# Patient Record
Sex: MALE | URBAN - METROPOLITAN AREA
[De-identification: names, ages, dates, MRNs, and addresses within clinical notes are randomized per-mention and may not be internally consistent; named-entity substitution may affect disease eponyms.]

---

## 2018-12-13 ENCOUNTER — AMBULATORY SURGICAL CENTER (OUTPATIENT)
Dept: URBAN - METROPOLITAN AREA SURGERY 20 | Facility: SURGERY | Age: 73
End: 2018-12-13

## 2018-12-13 DIAGNOSIS — K64.8 OTHER HEMORRHOIDS: ICD-10-CM

## 2018-12-13 DIAGNOSIS — K57.30 DIVERTICULOSIS OF LARGE INTESTINE WITHOUT PERFORATION OR ABS: ICD-10-CM

## 2018-12-13 DIAGNOSIS — Z85.038 PERSONAL HISTORY OF OTHER MALIGNANT NEOPLASM OF LARGE INTEST: ICD-10-CM

## 2018-12-13 DIAGNOSIS — Z12.11 ENCOUNTER FOR SCREENING FOR MALIGNANT NEOPLASM OF COLON: ICD-10-CM

## 2018-12-13 PROCEDURE — 45378 DIAGNOSTIC COLONOSCOPY: CPT | Mod: 33

## 2018-12-13 NOTE — SERVICEHPINOTES
NURA HILL  is a  73  male   who presents today for a  Colonoscopy   for   the indications listed below. The updated Patient Profile was reviewed prior to the procedure, in conjunction with the Physical Exam, including medical conditions, surgical procedures, medications, allergies, family history and social history. See Physical Exam time stamp below for date and time of HPI completion.Pre-operatively, I reviewed the indication(s) for the procedure, the risks of the procedure [including but not limited to: unexpected bleeding possibly requiring hospitalization and/or unplanned repeat procedures, perforation possibly requiring surgical treatment, missed lesions and complications of sedation/MAC (also explained by anesthesia staff)]. I have evaluated the patient for risks associated with the planned anesthesia and the procedure to be performed and find the patient an acceptable candidate for IV sedation.Multiple opportunities were provided for any questions or concerns, and all questions were answered satisfactorily before any anesthesia was administered. We will proceed with the planned procedure.BR

## 2019-09-11 ENCOUNTER — OFFICE VISIT (OUTPATIENT)
Dept: CARDIOLOGY | Facility: CLINIC | Age: 74
End: 2019-09-11

## 2019-09-11 VITALS
HEIGHT: 72 IN | BODY MASS INDEX: 28.04 KG/M2 | DIASTOLIC BLOOD PRESSURE: 60 MMHG | HEART RATE: 64 BPM | WEIGHT: 207 LBS | SYSTOLIC BLOOD PRESSURE: 122 MMHG

## 2019-09-11 DIAGNOSIS — I35.1 MODERATE AORTIC INSUFFICIENCY: ICD-10-CM

## 2019-09-11 DIAGNOSIS — Z95.1 HISTORY OF CORONARY ARTERY BYPASS SURGERY: ICD-10-CM

## 2019-09-11 DIAGNOSIS — I34.0 MITRAL VALVE INSUFFICIENCY, UNSPECIFIED ETIOLOGY: ICD-10-CM

## 2019-09-11 DIAGNOSIS — E78.5 HYPERLIPIDEMIA, UNSPECIFIED HYPERLIPIDEMIA TYPE: ICD-10-CM

## 2019-09-11 DIAGNOSIS — I25.10 CORONARY ARTERY DISEASE INVOLVING NATIVE CORONARY ARTERY OF NATIVE HEART WITHOUT ANGINA PECTORIS: Primary | ICD-10-CM

## 2019-09-11 DIAGNOSIS — I10 ESSENTIAL HYPERTENSION: ICD-10-CM

## 2019-09-11 PROCEDURE — 99214 OFFICE O/P EST MOD 30 MIN: CPT | Performed by: INTERNAL MEDICINE

## 2019-09-11 RX ORDER — LISINOPRIL 20 MG/1
20 TABLET ORAL DAILY
COMMUNITY
End: 2020-03-11

## 2019-09-11 RX ORDER — TERAZOSIN 2 MG/1
2 CAPSULE ORAL NIGHTLY
COMMUNITY
End: 2020-05-29

## 2019-09-11 RX ORDER — PRAVASTATIN SODIUM 10 MG
20 TABLET ORAL DAILY
COMMUNITY

## 2019-09-11 NOTE — PROGRESS NOTES
\[facility]\"" [facility]        Subjective:     Encounter Date:09/11/2019      Patient ID: Carlton Núñez is a 74 y.o. male.      HPI: I saw Carlton Núñez today for cardiovascular care.  He is a pleasant and active 74-year-old male.  He is free of angina.  He does report history of coronary artery disease status post coronary artery bypass surgery 12/1/1996 with a left COLLETTE to the LAD, and a right COLLETTE to the right coronary artery.  He denies any progressive dyspnea on exertion and is free of orthopnea or PND no lower extremity edema.  He is free of syncope near syncope or palpitation.  He does have a history of dyslipidemia and is on pravastatin 10 mg a day.  His lipids are monitored by Dr. Chan, his primary care physician.  He is recently been found to have type 2 diabetes and is maintained on diet and metformin at 1000 mg twice daily.  He has a history of hypertension which remains controlled with the addition of Terazosin 2 mg twice a day by Dr. Chan.  Patient did not tolerate clonidine.    His last echocardiogram from 5/18/2017 revealed left ventricular ejection fraction 55 to 60%.  He demonstrated moderate aortic insufficiency, mild to moderate mitral insufficiency and mild tricuspid insufficiency.      The following portions of the patient's history were reviewed and updated as appropriate: allergies, current medications, past family history, past medical history, past social history, past surgical history and problem list.    Problem List:  Patient Active Problem List   Diagnosis   • Hyperlipidemia   • LUCY (obstructive sleep apnea)   • Hypertension   • Myocardial infarction (CMS/HCC)   • Coronary artery disease   • Atrial fibrillation (CMS/HCC)   • Colon cancer (CMS/HCC)   • History of echocardiogram   • History of nuclear stress test   • History of cardiac cath   • History of coronary artery bypass surgery   • Moderate aortic insufficiency   • Mitral valve insufficiency       Past Medical  History:  Past Medical History:   Diagnosis Date   • Allergy    • Atrial fibrillation (CMS/HCC)    • Colon cancer (CMS/HCC)    • Coronary artery disease    • Heart attack (CMS/HCC)    • High cholesterol    • History of being hospitalized     Heart, colon cancer, hernia   • History of cardiac cath     11/28/2016 - Two vessel CAD with patent LIMA/KARIE graftsto LAD and RCA. Culprit vessel likely diagonal with 95% ostial lesion, too small for intervension, best treated medically. LCX 60% proximal but no flow limiting. EF 55-60%.   • History of echocardiogram     5/18/17 - Mild concentric LVH. EF 55-60%. Mildly dilated LA. Moderate AR. Mild to moderate MR. Mild TR ad PA   • History of nuclear stress test     11/10/16 - Mild inferior wall hypokinesis. Inferoapical wall hypokinesis. The overall EF is 56%. Study suggestive of prior inferior wall infarction with a mild to moderate area of ischemia remaining.   • Hyperlipidemia    • Hypertension    • Irregular heart beat    • Kidney stones    • Myocardial infarction (CMS/HCC)    • LUCY (obstructive sleep apnea)        Past Surgical History:  Past Surgical History:   Procedure Laterality Date   • CORONARY ARTERY BYPASS GRAFT  12/01/1996    LIMA to LAD and KARIE to RCA, performed by Dr Barney.       Social History:  Social History     Socioeconomic History   • Marital status:      Spouse name: Not on file   • Number of children: Not on file   • Years of education: Not on file   • Highest education level: Not on file   Tobacco Use   • Smoking status: Never Smoker   Substance and Sexual Activity   • Alcohol use: Yes       Allergies:  Allergies   Allergen Reactions   • Contrast Dye    • Phenergan [Promethazine Hcl] Unknown (See Comments)     .         ROS:  Review of Systems   Constitution: Negative for weakness and malaise/fatigue.   HENT: Negative for hearing loss and nosebleeds.    Eyes: Negative for double vision and visual disturbance.   Cardiovascular: Negative for  "chest pain, claudication, dyspnea on exertion, near-syncope, orthopnea, palpitations, paroxysmal nocturnal dyspnea and syncope.   Respiratory: Negative for cough, shortness of breath, sleep disturbances due to breathing, snoring, sputum production and wheezing.    Endocrine: Negative for cold intolerance, heat intolerance, polydipsia and polyuria.   Hematologic/Lymphatic: Negative for adenopathy and bleeding problem. Does not bruise/bleed easily.   Skin: Negative for flushing and itching.   Musculoskeletal: Negative for back pain, falls, joint pain, joint swelling, muscle weakness and neck pain.   Gastrointestinal: Negative for abdominal pain, dysphagia, heartburn, nausea and vomiting.   Genitourinary: Negative for dysuria and frequency.   Neurological: Negative for disturbances in coordination, dizziness, light-headedness, loss of balance and numbness.   Psychiatric/Behavioral: Negative for altered mental status and memory loss. The patient is not nervous/anxious.    Allergic/Immunologic: Negative for hives and persistent infections.          Objective:         /60   Pulse 64   Ht 182.9 cm (72\")   Wt 93.9 kg (207 lb)   BMI 28.07 kg/m²     Physical Exam   Constitutional: He is oriented to person, place, and time. He appears well-developed and well-nourished.   HENT:   Head: Normocephalic and atraumatic.   Eyes: Conjunctivae and EOM are normal. Pupils are equal, round, and reactive to light.   Neck: Neck supple. No thyromegaly present.   Cardiovascular: Normal rate, regular rhythm, S1 normal, S2 normal and intact distal pulses. PMI is not displaced. Exam reveals gallop and S4. Exam reveals no S3, no distant heart sounds, no friction rub, no midsystolic click and no opening snap.   No murmur heard.  Pulses:       Carotid pulses are 2+ on the right side, and 2+ on the left side.       Radial pulses are 2+ on the right side, and 2+ on the left side.        Femoral pulses are 2+ on the right side, and 2+ on the " left side.       Dorsalis pedis pulses are 2+ on the right side, and 2+ on the left side.        Posterior tibial pulses are 2+ on the right side, and 2+ on the left side.   1/6 systolic murmur at the left sternal border, 2/6 diastolic murmur at the right upper sternal border.   Pulmonary/Chest: Effort normal and breath sounds normal. No respiratory distress. He has no wheezes. He has no rales.   Abdominal: Soft. Bowel sounds are normal. He exhibits no distension and no mass. There is no tenderness.   Musculoskeletal: Normal range of motion. He exhibits no edema.   Neurological: He is alert and oriented to person, place, and time.   Skin: Skin is warm and dry. No erythema.   Psychiatric: He has a normal mood and affect.       In-Office Procedure(s):  Procedures    ASCVD RIsk Score::  The ASCVD Risk score (Coltorly AMARAL Jr., et al., 2013) failed to calculate for the following reasons:    Cannot find a previous HDL lab    Cannot find a previous total cholesterol lab        Assessment/Plan:         1. Coronary artery disease involving native coronary artery of native heart without angina pectoris  Stable stable    2. History of coronary artery bypass surgery  Stable    3. Moderate aortic insufficiency  Compensated    4. Mitral valve insufficiency, unspecified etiology  Compensated    5. Essential hypertension  Controlled    6. Hyperlipidemia, unspecified hyperlipidemia type  Continue diet and pravastatin 10 mg daily    7.  Type 2 diabetes   Diet and metformin/glimepiride    Mr. Núñez is stable.  He is active free of angina euvolemic.  He is tolerating his medications well no reported side effects.  No changes in his medications I will plan to see him in follow-up in 6 months sooner.       Alton Coronado MD  09/11/19  .

## 2020-03-11 ENCOUNTER — OFFICE VISIT (OUTPATIENT)
Dept: CARDIOLOGY | Facility: CLINIC | Age: 75
End: 2020-03-11

## 2020-03-11 VITALS
SYSTOLIC BLOOD PRESSURE: 134 MMHG | HEIGHT: 72 IN | DIASTOLIC BLOOD PRESSURE: 74 MMHG | BODY MASS INDEX: 27.22 KG/M2 | WEIGHT: 201 LBS | HEART RATE: 60 BPM

## 2020-03-11 DIAGNOSIS — I21.9 MYOCARDIAL INFARCTION, UNSPECIFIED MI TYPE, UNSPECIFIED ARTERY (HCC): ICD-10-CM

## 2020-03-11 DIAGNOSIS — Z95.1 HISTORY OF CORONARY ARTERY BYPASS SURGERY: ICD-10-CM

## 2020-03-11 DIAGNOSIS — R53.83 FATIGUE, UNSPECIFIED TYPE: ICD-10-CM

## 2020-03-11 DIAGNOSIS — R00.2 PALPITATIONS: ICD-10-CM

## 2020-03-11 DIAGNOSIS — E78.5 HYPERLIPIDEMIA, UNSPECIFIED HYPERLIPIDEMIA TYPE: ICD-10-CM

## 2020-03-11 DIAGNOSIS — I10 ESSENTIAL HYPERTENSION: ICD-10-CM

## 2020-03-11 DIAGNOSIS — R06.89 RESPIRATORY INSUFFICIENCY: ICD-10-CM

## 2020-03-11 DIAGNOSIS — I35.1 MODERATE AORTIC INSUFFICIENCY: ICD-10-CM

## 2020-03-11 DIAGNOSIS — I25.10 CORONARY ARTERY DISEASE INVOLVING NATIVE CORONARY ARTERY OF NATIVE HEART WITHOUT ANGINA PECTORIS: Primary | ICD-10-CM

## 2020-03-11 DIAGNOSIS — I34.0 MITRAL VALVE INSUFFICIENCY, UNSPECIFIED ETIOLOGY: ICD-10-CM

## 2020-03-11 DIAGNOSIS — G47.33 OSA (OBSTRUCTIVE SLEEP APNEA): ICD-10-CM

## 2020-03-11 PROCEDURE — 99214 OFFICE O/P EST MOD 30 MIN: CPT | Performed by: INTERNAL MEDICINE

## 2020-03-11 RX ORDER — ICOSAPENT ETHYL 1000 MG/1
2 CAPSULE ORAL 2 TIMES DAILY WITH MEALS
COMMUNITY

## 2020-03-11 NOTE — PROGRESS NOTES
[facility]        Subjective:     Encounter Date:03/11/2020      Patient ID: Carlton Núñez is a 74 y.o. male.      HPI: I saw the Carlton Núñez today for cardiovascular care.  He is a pleasant 74-year-old male with a primary complaint of generalized fatigue which has progressed as well as palpitations or irregular heartbeat.  He does not report chest pain pressure or tightness.  He does have a history of coronary heart disease status post coronary artery bypass surgery 1996 with a LIMA to the LAD and a KARIE to the right coronary artery.  His last noninvasive ischemic assessment was in 2016 with nuclear stress testing revealing a left ventricular ejection fraction 56% with mild to moderate inferior ischemia.  He underwent coronary angiography subsequently which revealed patent LIMA and KARIE grafts respectively to the LAD and right coronary artery echocardiogram obtained 5/18/2017 revealed preserved left ventricular function moderate aortic insufficiency and mild to moderate mitral insufficiency.  He does have dyspnea on exertion which he feels is progressive.  He sleeps with one pillow free of orthopnea or PND and no lower extremity edema.  He denies syncope or near syncope.  He does report increased palpitations or irregular heartbeat.  He has a history of hypertension which is controlled.  He has known dyslipidemia and is on pravastatin 10 mg daily.  His lipids are monitored by his primary care physician Dr. Dylan Chan.      The following portions of the patient's history were reviewed and updated as appropriate: allergies, current medications, past family history, past medical history, past social history, past surgical history and problem list.    Problem List:  Patient Active Problem List   Diagnosis   • Hyperlipidemia   • LUCY (obstructive sleep apnea)   • Hypertension   • Myocardial infarction (CMS/HCC)   • Coronary artery disease   • Colon cancer (CMS/[facility])   • History of  echocardiogram   • History of nuclear stress test   • History of cardiac cath   • History of coronary artery bypass surgery   • Moderate aortic insufficiency   • Mitral valve insufficiency   • Fatigue   • Respiratory insufficiency   • Palpitations       Past Medical History:  Past Medical History:   Diagnosis Date   • Allergy    • Atrial fibrillation (CMS/HCC)    • Colon cancer (CMS/HCC)    • Coronary artery disease    • Heart attack (CMS/HCC)    • High cholesterol    • History of being hospitalized     Heart, colon cancer, hernia   • History of cardiac cath     11/28/2016 - Two vessel CAD with patent LIMA/KARIE graftsto LAD and RCA. Culprit vessel likely diagonal with 95% ostial lesion, too small for intervension, best treated medically. LCX 60% proximal but no flow limiting. EF 55-60%.   • History of echocardiogram     5/18/17 - Mild concentric LVH. EF 55-60%. Mildly dilated LA. Moderate AR. Mild to moderate MR. Mild TR ad MI   • History of nuclear stress test     11/10/16 - Mild inferior wall hypokinesis. Inferoapical wall hypokinesis. The overall EF is 56%. Study suggestive of prior inferior wall infarction with a mild to moderate area of ischemia remaining.   • Hyperlipidemia    • Hypertension    • Irregular heart beat    • Kidney stones    • Myocardial infarction (CMS/HCC)    • LUCY (obstructive sleep apnea)        Past Surgical History:  Past Surgical History:   Procedure Laterality Date   • CORONARY ARTERY BYPASS GRAFT  12/01/1996    LIMA to LAD and KARIE to RCA, performed by Dr Barney.       Social History:  Social History     Socioeconomic History   • Marital status:      Spouse name: Not on file   • Number of children: Not on file   • Years of education: Not on file   • Highest education level: Not on file   Tobacco Use   • Smoking status: Never Smoker   • Smokeless tobacco: Never Used   Substance and Sexual Activity   • Alcohol use: Yes       Allergies:  Allergies   Allergen Reactions   • Contrast  "Dye Hives   • Phenergan [Promethazine Hcl] Nausea And Vomiting     .         ROS:  Review of Systems   Constitution: Positive for malaise/fatigue.   HENT: Negative for hearing loss and nosebleeds.    Eyes: Negative for double vision and visual disturbance.   Cardiovascular: Positive for dyspnea on exertion and palpitations. Negative for chest pain, claudication, near-syncope, orthopnea, paroxysmal nocturnal dyspnea and syncope.   Respiratory: Negative for cough, shortness of breath, sleep disturbances due to breathing, snoring, sputum production and wheezing.    Endocrine: Negative for cold intolerance, heat intolerance, polydipsia and polyuria.   Hematologic/Lymphatic: Negative for adenopathy and bleeding problem. Does not bruise/bleed easily.   Skin: Negative for flushing and itching.   Musculoskeletal: Negative for back pain, falls, joint pain, joint swelling, muscle weakness and neck pain.   Gastrointestinal: Negative for abdominal pain, dysphagia, heartburn, nausea and vomiting.   Genitourinary: Negative for dysuria and frequency.   Neurological: Negative for disturbances in coordination, dizziness, light-headedness, loss of balance, numbness and weakness.   Psychiatric/Behavioral: Negative for altered mental status and memory loss. The patient is not nervous/anxious.    Allergic/Immunologic: Negative for hives and persistent infections.          Objective:         /74   Pulse 60   Ht 182.9 cm (72\")   Wt 91.2 kg (201 lb)   BMI 27.26 kg/m²     Physical Exam   Constitutional: He is oriented to person, place, and time. He appears well-developed and well-nourished.   HENT:   Head: Normocephalic and atraumatic.   Eyes: Pupils are equal, round, and reactive to light. Conjunctivae and EOM are normal.   Neck: Neck supple. No thyromegaly present.   Cardiovascular: Normal rate, regular rhythm, S1 normal, S2 normal and intact distal pulses. PMI is not displaced. Exam reveals gallop and S4. Exam reveals no S3, no " distant heart sounds, no friction rub, no midsystolic click and no opening snap.   No murmur heard.  Pulses:       Carotid pulses are 2+ on the right side, and 2+ on the left side.       Radial pulses are 2+ on the right side, and 2+ on the left side.        Femoral pulses are 2+ on the right side, and 2+ on the left side.       Dorsalis pedis pulses are 2+ on the right side, and 2+ on the left side.        Posterior tibial pulses are 2+ on the right side, and 2+ on the left side.   1/6 systolic murmur left sternal border and 2/6 diastolic murmur at the right upper sternal border   Pulmonary/Chest: Effort normal and breath sounds normal. No respiratory distress. He has no wheezes. He has no rales.   Bibasilar crackles   Abdominal: Soft. Bowel sounds are normal. He exhibits no distension and no mass. There is no tenderness.   Musculoskeletal: Normal range of motion. He exhibits no edema.   Neurological: He is alert and oriented to person, place, and time.   Skin: Skin is warm and dry. No erythema.   Psychiatric: He has a normal mood and affect.       In-Office Procedure(s):  Procedures    ASCVD RIsk Score::  The ASCVD Risk score (Winton DC Jr., et al., 2013) failed to calculate for the following reasons:    Cannot find a previous HDL lab    Cannot find a previous total cholesterol lab        Assessment/Plan:         1. Coronary artery disease involving native coronary artery of native heart without angina pectoris  Fatigue respiratory insufficiency possible anginal equivalent    2. History of coronary artery bypass surgery      3. Myocardial infarction, unspecified MI type, unspecified artery (CMS/HCC)      4. Essential hypertension  Controlled    5. Hyperlipidemia, unspecified hyperlipidemia type  Continue pravastatin 10 mg daily    6. LUCY (obstructive sleep apnea)  Continue CPAP    7. Moderate aortic insufficiency  Presently compensated    8. Mitral valve insufficiency, unspecified etiology  Presently  compensated    9. Fatigue, unspecified type  New onset possible anginal equivalent    10. Respiratory insufficiency  Progressive possible anginal equivalent    11. Palpitations  Mildly symptomatic    12.  Type 2 diabetes    Mr. Núñez reports progressive dyspnea on exertion and distinct increase in his fatigue level.  Both may represent anginal equivalent.  I will obtain a stress Cardiolite noninvasive ischemic assessment and an echocardiogram.  We will consider ambulatory EKG monitor following the studies.  We discussed importance of continued risk modification by following his medical regimen, restricting salt in his diet and following a low-cholesterol low saturated fat diet.       Alton Coronado MD  03/11/20  .

## 2020-03-16 ENCOUNTER — TELEPHONE (OUTPATIENT)
Dept: CARDIOLOGY | Facility: CLINIC | Age: 75
End: 2020-03-16

## 2020-04-06 ENCOUNTER — APPOINTMENT (OUTPATIENT)
Dept: CARDIOLOGY | Facility: HOSPITAL | Age: 75
End: 2020-04-06

## 2020-04-06 ENCOUNTER — APPOINTMENT (OUTPATIENT)
Dept: NUCLEAR MEDICINE | Facility: HOSPITAL | Age: 75
End: 2020-04-06

## 2020-05-29 ENCOUNTER — HOSPITAL ENCOUNTER (OUTPATIENT)
Dept: NUCLEAR MEDICINE | Facility: HOSPITAL | Age: 75
Discharge: HOME | End: 2020-05-29

## 2020-05-29 ENCOUNTER — HOSPITAL ENCOUNTER (OUTPATIENT)
Dept: CARDIOLOGY | Facility: HOSPITAL | Age: 75
Discharge: HOME | End: 2020-05-29
Admitting: INTERNAL MEDICINE

## 2020-05-29 VITALS
WEIGHT: 201 LBS | HEART RATE: 60 BPM | SYSTOLIC BLOOD PRESSURE: 130 MMHG | HEIGHT: 72 IN | BODY MASS INDEX: 27.22 KG/M2 | DIASTOLIC BLOOD PRESSURE: 78 MMHG

## 2020-05-29 DIAGNOSIS — I35.1 MODERATE AORTIC INSUFFICIENCY: ICD-10-CM

## 2020-05-29 DIAGNOSIS — G47.33 OSA (OBSTRUCTIVE SLEEP APNEA): ICD-10-CM

## 2020-05-29 DIAGNOSIS — I21.9 MYOCARDIAL INFARCTION, UNSPECIFIED MI TYPE, UNSPECIFIED ARTERY (HCC): ICD-10-CM

## 2020-05-29 DIAGNOSIS — R06.89 RESPIRATORY INSUFFICIENCY: ICD-10-CM

## 2020-05-29 DIAGNOSIS — R53.83 FATIGUE, UNSPECIFIED TYPE: ICD-10-CM

## 2020-05-29 DIAGNOSIS — R00.2 PALPITATIONS: ICD-10-CM

## 2020-05-29 DIAGNOSIS — I34.0 MITRAL VALVE INSUFFICIENCY, UNSPECIFIED ETIOLOGY: ICD-10-CM

## 2020-05-29 DIAGNOSIS — E78.5 HYPERLIPIDEMIA, UNSPECIFIED HYPERLIPIDEMIA TYPE: ICD-10-CM

## 2020-05-29 DIAGNOSIS — I10 ESSENTIAL HYPERTENSION: ICD-10-CM

## 2020-05-29 DIAGNOSIS — I25.10 CORONARY ARTERY DISEASE INVOLVING NATIVE CORONARY ARTERY OF NATIVE HEART WITHOUT ANGINA PECTORIS: ICD-10-CM

## 2020-05-29 LAB
BH CV STRESS BP STAGE 1: NORMAL
BH CV STRESS DURATION MIN STAGE 1: 2
BH CV STRESS DURATION SEC STAGE 1: 59
BH CV STRESS GRADE STAGE 1: 10
BH CV STRESS HR STAGE 1: 148
BH CV STRESS METS STAGE 1: 5
BH CV STRESS PROTOCOL 1: NORMAL
BH CV STRESS RECOVERY BP: NORMAL MMHG
BH CV STRESS RECOVERY HR: 83 BPM
BH CV STRESS SPEED STAGE 1: 1.7
BH CV STRESS STAGE 1: 1
LV EF NUC BP: 67 %
MAXIMAL PREDICTED HEART RATE: 144 BPM
PERCENT MAX PREDICTED HR: 105.56 %
STRESS BASELINE BP: NORMAL MMHG
STRESS BASELINE HR: 67 BPM
STRESS PERCENT HR: 124 %
STRESS POST ESTIMATED WORKLOAD: 4.6 METS
STRESS POST EXERCISE DUR MIN: 2 MIN
STRESS POST EXERCISE DUR SEC: 59 SEC
STRESS POST PEAK BP: NORMAL MMHG
STRESS POST PEAK HR: 152 BPM
STRESS TARGET HR: 122 BPM

## 2020-05-29 PROCEDURE — 93017 CV STRESS TEST TRACING ONLY: CPT

## 2020-05-29 PROCEDURE — A9500 TC99M SESTAMIBI: HCPCS | Performed by: INTERNAL MEDICINE

## 2020-05-29 PROCEDURE — 93018 CV STRESS TEST I&R ONLY: CPT | Performed by: INTERNAL MEDICINE

## 2020-05-29 PROCEDURE — 93306 TTE W/DOPPLER COMPLETE: CPT

## 2020-05-29 PROCEDURE — 0 TECHNETIUM SESTAMIBI: Performed by: INTERNAL MEDICINE

## 2020-05-29 PROCEDURE — 78452 HT MUSCLE IMAGE SPECT MULT: CPT | Performed by: INTERNAL MEDICINE

## 2020-05-29 PROCEDURE — 93306 TTE W/DOPPLER COMPLETE: CPT | Performed by: INTERNAL MEDICINE

## 2020-05-29 PROCEDURE — 78452 HT MUSCLE IMAGE SPECT MULT: CPT

## 2020-05-29 RX ORDER — TERAZOSIN 2 MG/1
1 CAPSULE ORAL 2 TIMES DAILY
COMMUNITY

## 2020-05-29 RX ADMIN — TECHNETIUM TC 99M SESTAMIBI 1 DOSE: 1 INJECTION INTRAVENOUS at 08:55

## 2020-05-29 RX ADMIN — TECHNETIUM TC 99M SESTAMIBI 1 DOSE: 1 INJECTION INTRAVENOUS at 07:50

## 2020-06-01 DIAGNOSIS — Z01.818 PRE-OP TESTING: ICD-10-CM

## 2020-06-01 DIAGNOSIS — I20.8 ANGINAL EQUIVALENT (HCC): ICD-10-CM

## 2020-06-01 DIAGNOSIS — R53.83 FATIGUE, UNSPECIFIED TYPE: Primary | ICD-10-CM

## 2020-06-01 LAB
AORTIC DIMENSIONLESS INDEX: 0.7 (DI)
BH CV ECHO MEAS - ACS: 1.6 CM
BH CV ECHO MEAS - AI DEC SLOPE: 278 CM/SEC^2
BH CV ECHO MEAS - AI MAX PG: 89.1 MMHG
BH CV ECHO MEAS - AI MAX VEL: 472 CM/SEC
BH CV ECHO MEAS - AI P1/2T: 497.3 MSEC
BH CV ECHO MEAS - AO MAX PG (FULL): 5.3 MMHG
BH CV ECHO MEAS - AO MAX PG: 9.5 MMHG
BH CV ECHO MEAS - AO MEAN PG (FULL): 2 MMHG
BH CV ECHO MEAS - AO MEAN PG: 4 MMHG
BH CV ECHO MEAS - AO ROOT AREA (BSA CORRECTED): 1.7
BH CV ECHO MEAS - AO ROOT AREA: 11.3 CM^2
BH CV ECHO MEAS - AO ROOT DIAM: 3.8 CM
BH CV ECHO MEAS - AO V2 MAX: 154 CM/SEC
BH CV ECHO MEAS - AO V2 MEAN: 95 CM/SEC
BH CV ECHO MEAS - AO V2 VTI: 28.3 CM
BH CV ECHO MEAS - AVA(I,A): 3 CM^2
BH CV ECHO MEAS - AVA(I,D): 3 CM^2
BH CV ECHO MEAS - AVA(V,A): 2.8 CM^2
BH CV ECHO MEAS - AVA(V,D): 2.8 CM^2
BH CV ECHO MEAS - BSA(HAYCOCK): 2.3 M^2
BH CV ECHO MEAS - BSA: 2.2 M^2
BH CV ECHO MEAS - BZI_BMI: 30 KILOGRAMS/M^2
BH CV ECHO MEAS - BZI_METRIC_HEIGHT: 182.9 CM
BH CV ECHO MEAS - BZI_METRIC_WEIGHT: 100.2 KG
BH CV ECHO MEAS - EDV(CUBED): 166.4 ML
BH CV ECHO MEAS - EDV(MOD-SP2): 76 ML
BH CV ECHO MEAS - EDV(MOD-SP4): 83 ML
BH CV ECHO MEAS - EDV(TEICH): 147.4 ML
BH CV ECHO MEAS - EF(CUBED): 76.4 %
BH CV ECHO MEAS - EF(MOD-BP): 65 %
BH CV ECHO MEAS - EF(MOD-SP2): 65.8 %
BH CV ECHO MEAS - EF(MOD-SP4): 67.5 %
BH CV ECHO MEAS - EF(TEICH): 67.8 %
BH CV ECHO MEAS - ESV(CUBED): 39.3 ML
BH CV ECHO MEAS - ESV(MOD-SP2): 26 ML
BH CV ECHO MEAS - ESV(MOD-SP4): 27 ML
BH CV ECHO MEAS - ESV(TEICH): 47.4 ML
BH CV ECHO MEAS - FS: 38.2 %
BH CV ECHO MEAS - IVS/LVPW: 1
BH CV ECHO MEAS - IVSD: 1 CM
BH CV ECHO MEAS - LAT PEAK E' VEL: 12.8 CM/SEC
BH CV ECHO MEAS - LV DIASTOLIC VOL/BSA (35-75): 37.3 ML/M^2
BH CV ECHO MEAS - LV MASS(C)D: 213.2 GRAMS
BH CV ECHO MEAS - LV MASS(C)DI: 95.9 GRAMS/M^2
BH CV ECHO MEAS - LV MAX PG: 4.2 MMHG
BH CV ECHO MEAS - LV MEAN PG: 2 MMHG
BH CV ECHO MEAS - LV SYSTOLIC VOL/BSA (12-30): 12.1 ML/M^2
BH CV ECHO MEAS - LV V1 MAX: 102 CM/SEC
BH CV ECHO MEAS - LV V1 MEAN: 68.5 CM/SEC
BH CV ECHO MEAS - LV V1 VTI: 20.7 CM
BH CV ECHO MEAS - LVIDD: 5.5 CM
BH CV ECHO MEAS - LVIDS: 3.4 CM
BH CV ECHO MEAS - LVLD AP2: 7.8 CM
BH CV ECHO MEAS - LVLD AP4: 7.8 CM
BH CV ECHO MEAS - LVLS AP2: 6.3 CM
BH CV ECHO MEAS - LVLS AP4: 6.7 CM
BH CV ECHO MEAS - LVOT AREA (M): 4.2 CM^2
BH CV ECHO MEAS - LVOT AREA: 4.2 CM^2
BH CV ECHO MEAS - LVOT DIAM: 2.3 CM
BH CV ECHO MEAS - LVPWD: 1 CM
BH CV ECHO MEAS - MED PEAK E' VEL: 6.8 CM/SEC
BH CV ECHO MEAS - MV A DUR: 0.15 SEC
BH CV ECHO MEAS - MV A MAX VEL: 73.2 CM/SEC
BH CV ECHO MEAS - MV DEC SLOPE: 402 CM/SEC^2
BH CV ECHO MEAS - MV DEC TIME: 214 SEC
BH CV ECHO MEAS - MV E MAX VEL: 82.9 CM/SEC
BH CV ECHO MEAS - MV E/A: 1.1
BH CV ECHO MEAS - MV MAX PG: 2.7 MMHG
BH CV ECHO MEAS - MV MEAN PG: 1 MMHG
BH CV ECHO MEAS - MV P1/2T MAX VEL: 100 CM/SEC
BH CV ECHO MEAS - MV P1/2T: 72.9 MSEC
BH CV ECHO MEAS - MV V2 MAX: 81.8 CM/SEC
BH CV ECHO MEAS - MV V2 MEAN: 53.7 CM/SEC
BH CV ECHO MEAS - MV V2 VTI: 21.2 CM
BH CV ECHO MEAS - MVA P1/2T LCG: 2.2 CM^2
BH CV ECHO MEAS - MVA(P1/2T): 3 CM^2
BH CV ECHO MEAS - MVA(VTI): 4.1 CM^2
BH CV ECHO MEAS - PA ACC TIME: 0.05 SEC
BH CV ECHO MEAS - PA MAX PG (FULL): 5 MMHG
BH CV ECHO MEAS - PA MAX PG: 7.4 MMHG
BH CV ECHO MEAS - PA PR(ACCEL): 55.8 MMHG
BH CV ECHO MEAS - PA V2 MAX: 136 CM/SEC
BH CV ECHO MEAS - PULM A REVS DUR: 0.1 SEC
BH CV ECHO MEAS - PULM A REVS VEL: 44.5 CM/SEC
BH CV ECHO MEAS - PULM DIAS VEL: 70.2 CM/SEC
BH CV ECHO MEAS - PULM S/D: 0.97
BH CV ECHO MEAS - PULM SYS VEL: 67.9 CM/SEC
BH CV ECHO MEAS - RAP SYSTOLE: 3 MMHG
BH CV ECHO MEAS - RV MAX PG: 2.4 MMHG
BH CV ECHO MEAS - RV MEAN PG: 1 MMHG
BH CV ECHO MEAS - RV V1 MAX: 76.9 CM/SEC
BH CV ECHO MEAS - RV V1 MEAN: 51.9 CM/SEC
BH CV ECHO MEAS - RV V1 VTI: 16.3 CM
BH CV ECHO MEAS - RVSP: 27 MMHG
BH CV ECHO MEAS - SI(AO): 144.4 ML/M^2
BH CV ECHO MEAS - SI(CUBED): 57.2 ML/M^2
BH CV ECHO MEAS - SI(LVOT): 38.7 ML/M^2
BH CV ECHO MEAS - SI(MOD-SP2): 22.5 ML/M^2
BH CV ECHO MEAS - SI(MOD-SP4): 25.2 ML/M^2
BH CV ECHO MEAS - SI(TEICH): 45 ML/M^2
BH CV ECHO MEAS - SV(AO): 321 ML
BH CV ECHO MEAS - SV(CUBED): 127.1 ML
BH CV ECHO MEAS - SV(LVOT): 86 ML
BH CV ECHO MEAS - SV(MOD-SP2): 50 ML
BH CV ECHO MEAS - SV(MOD-SP4): 56 ML
BH CV ECHO MEAS - SV(TEICH): 100 ML
BH CV ECHO MEAS - TAPSE (>1.6): 1.9 CM2
BH CV ECHO MEAS - TR MAX PG: 24 MMHG
BH CV ECHO MEAS - TR MAX VEL: 247 CM/SEC
BH CV ECHO MEASUREMENTS AVERAGE E/E' RATIO: 8.46
BH CV XLRA - TDI S': 10.1 CM/SEC
LEFT ATRIUM VOLUME INDEX: 24.1 ML/M2
MAXIMAL PREDICTED HEART RATE: 144 BPM
STRESS TARGET HR: 122 BPM

## 2020-06-01 RX ORDER — PREDNISONE 50 MG/1
TABLET ORAL
Qty: 3 TABLET | Refills: 0 | Status: SHIPPED | OUTPATIENT
Start: 2020-06-01 | End: 2020-06-23 | Stop reason: HOSPADM

## 2020-06-01 RX ORDER — CIMETIDINE 400 MG/1
TABLET, FILM COATED ORAL
Qty: 1 TABLET | Refills: 0 | Status: SHIPPED | OUTPATIENT
Start: 2020-06-01 | End: 2020-06-23 | Stop reason: HOSPADM

## 2020-06-04 PROBLEM — I20.89 ANGINAL EQUIVALENT: Status: ACTIVE | Noted: 2020-06-04

## 2020-06-04 PROBLEM — I20.8 ANGINAL EQUIVALENT (HCC): Status: ACTIVE | Noted: 2020-06-04

## 2020-06-18 ENCOUNTER — TRANSCRIBE ORDERS (OUTPATIENT)
Dept: SLEEP MEDICINE | Facility: HOSPITAL | Age: 75
End: 2020-06-18

## 2020-06-18 ENCOUNTER — LAB (OUTPATIENT)
Dept: LAB | Facility: HOSPITAL | Age: 75
End: 2020-06-18

## 2020-06-18 DIAGNOSIS — I20.8 ANGINAL EQUIVALENT (HCC): ICD-10-CM

## 2020-06-18 DIAGNOSIS — R53.83 FATIGUE, UNSPECIFIED TYPE: ICD-10-CM

## 2020-06-18 DIAGNOSIS — Z01.818 PRE-OP TESTING: ICD-10-CM

## 2020-06-18 DIAGNOSIS — Z01.818 OTHER SPECIFIED PRE-OPERATIVE EXAMINATION: Primary | ICD-10-CM

## 2020-06-18 LAB
ALBUMIN SERPL-MCNC: 4.5 G/DL (ref 3.5–5.2)
ALBUMIN/GLOB SERPL: 1.8 G/DL
ALP SERPL-CCNC: 69 U/L (ref 39–117)
ALT SERPL W P-5'-P-CCNC: 18 U/L (ref 1–41)
ANION GAP SERPL CALCULATED.3IONS-SCNC: 15 MMOL/L (ref 5–15)
AST SERPL-CCNC: 16 U/L (ref 1–40)
BASOPHILS # BLD AUTO: 0.03 10*3/MM3 (ref 0–0.2)
BASOPHILS NFR BLD AUTO: 0.3 % (ref 0–1.5)
BILIRUB SERPL-MCNC: 0.6 MG/DL (ref 0.2–1.2)
BUN BLD-MCNC: 27 MG/DL (ref 8–23)
BUN/CREAT SERPL: 22.1 (ref 7–25)
CALCIUM SPEC-SCNC: 10.2 MG/DL (ref 8.6–10.5)
CHLORIDE SERPL-SCNC: 100 MMOL/L (ref 98–107)
CO2 SERPL-SCNC: 23 MMOL/L (ref 22–29)
CREAT BLD-MCNC: 1.22 MG/DL (ref 0.76–1.27)
DEPRECATED RDW RBC AUTO: 47.3 FL (ref 37–54)
EOSINOPHIL # BLD AUTO: 0.24 10*3/MM3 (ref 0–0.4)
EOSINOPHIL NFR BLD AUTO: 2.7 % (ref 0.3–6.2)
ERYTHROCYTE [DISTWIDTH] IN BLOOD BY AUTOMATED COUNT: 14.3 % (ref 12.3–15.4)
GFR SERPL CREATININE-BSD FRML MDRD: 58 ML/MIN/1.73
GLOBULIN UR ELPH-MCNC: 2.5 GM/DL
GLUCOSE BLD-MCNC: 103 MG/DL (ref 65–99)
HCT VFR BLD AUTO: 45.5 % (ref 37.5–51)
HGB BLD-MCNC: 15.4 G/DL (ref 13–17.7)
IMM GRANULOCYTES # BLD AUTO: 0.04 10*3/MM3 (ref 0–0.05)
IMM GRANULOCYTES NFR BLD AUTO: 0.5 % (ref 0–0.5)
INR PPP: 0.98 (ref 0.9–1.1)
LYMPHOCYTES # BLD AUTO: 2.17 10*3/MM3 (ref 0.7–3.1)
LYMPHOCYTES NFR BLD AUTO: 24.5 % (ref 19.6–45.3)
MCH RBC QN AUTO: 30.4 PG (ref 26.6–33)
MCHC RBC AUTO-ENTMCNC: 33.8 G/DL (ref 31.5–35.7)
MCV RBC AUTO: 89.9 FL (ref 79–97)
MONOCYTES # BLD AUTO: 0.83 10*3/MM3 (ref 0.1–0.9)
MONOCYTES NFR BLD AUTO: 9.4 % (ref 5–12)
NEUTROPHILS # BLD AUTO: 5.55 10*3/MM3 (ref 1.7–7)
NEUTROPHILS NFR BLD AUTO: 62.6 % (ref 42.7–76)
NRBC BLD AUTO-RTO: 0 /100 WBC (ref 0–0.2)
PLATELET # BLD AUTO: 174 10*3/MM3 (ref 140–450)
PMV BLD AUTO: 8.3 FL (ref 6–12)
POTASSIUM BLD-SCNC: 4.5 MMOL/L (ref 3.5–5.2)
PROT SERPL-MCNC: 7 G/DL (ref 6–8.5)
PROTHROMBIN TIME: 12.7 SECONDS (ref 11.7–14.2)
RBC # BLD AUTO: 5.06 10*6/MM3 (ref 4.14–5.8)
SODIUM BLD-SCNC: 138 MMOL/L (ref 136–145)
WBC NRBC COR # BLD: 8.86 10*3/MM3 (ref 3.4–10.8)

## 2020-06-18 PROCEDURE — 80053 COMPREHEN METABOLIC PANEL: CPT

## 2020-06-18 PROCEDURE — 85610 PROTHROMBIN TIME: CPT

## 2020-06-18 PROCEDURE — 85025 COMPLETE CBC W/AUTO DIFF WBC: CPT

## 2020-06-18 PROCEDURE — 36415 COLL VENOUS BLD VENIPUNCTURE: CPT

## 2020-06-20 ENCOUNTER — LAB (OUTPATIENT)
Dept: LAB | Facility: HOSPITAL | Age: 75
End: 2020-06-20

## 2020-06-20 DIAGNOSIS — Z01.818 OTHER SPECIFIED PRE-OPERATIVE EXAMINATION: ICD-10-CM

## 2020-06-20 PROCEDURE — U0004 COV-19 TEST NON-CDC HGH THRU: HCPCS

## 2020-06-22 LAB
REF LAB TEST METHOD: NORMAL
SARS-COV-2 RNA RESP QL NAA+PROBE: NOT DETECTED

## 2020-06-23 ENCOUNTER — HOSPITAL ENCOUNTER (OUTPATIENT)
Facility: HOSPITAL | Age: 75
Setting detail: HOSPITAL OUTPATIENT SURGERY
Discharge: HOME | End: 2020-06-23
Attending: INTERNAL MEDICINE | Admitting: INTERNAL MEDICINE

## 2020-06-23 VITALS
HEIGHT: 72 IN | HEART RATE: 60 BPM | DIASTOLIC BLOOD PRESSURE: 62 MMHG | BODY MASS INDEX: 27.09 KG/M2 | RESPIRATION RATE: 18 BRPM | WEIGHT: 200 LBS | OXYGEN SATURATION: 97 % | SYSTOLIC BLOOD PRESSURE: 132 MMHG | TEMPERATURE: 98.6 F

## 2020-06-23 DIAGNOSIS — R53.83 FATIGUE, UNSPECIFIED TYPE: ICD-10-CM

## 2020-06-23 DIAGNOSIS — I20.8 ANGINAL EQUIVALENT (HCC): ICD-10-CM

## 2020-06-23 PROBLEM — R94.39 ABNORMAL NUCLEAR STRESS TEST: Status: ACTIVE | Noted: 2020-06-23

## 2020-06-23 LAB — GLUCOSE BLDC GLUCOMTR-MCNC: 208 MG/DL (ref 70–130)

## 2020-06-23 PROCEDURE — C1894 INTRO/SHEATH, NON-LASER: HCPCS | Performed by: INTERNAL MEDICINE

## 2020-06-23 PROCEDURE — 25010000002 FENTANYL CITRATE (PF) 100 MCG/2ML SOLUTION: Performed by: INTERNAL MEDICINE

## 2020-06-23 PROCEDURE — 0 IOPAMIDOL PER 1 ML: Performed by: INTERNAL MEDICINE

## 2020-06-23 PROCEDURE — 93459 L HRT ART/GRFT ANGIO: CPT | Performed by: INTERNAL MEDICINE

## 2020-06-23 PROCEDURE — 99153 MOD SED SAME PHYS/QHP EA: CPT | Performed by: INTERNAL MEDICINE

## 2020-06-23 PROCEDURE — 82962 GLUCOSE BLOOD TEST: CPT

## 2020-06-23 PROCEDURE — 25010000002 MIDAZOLAM PER 1 MG: Performed by: INTERNAL MEDICINE

## 2020-06-23 PROCEDURE — C1769 GUIDE WIRE: HCPCS | Performed by: INTERNAL MEDICINE

## 2020-06-23 PROCEDURE — 99152 MOD SED SAME PHYS/QHP 5/>YRS: CPT | Performed by: INTERNAL MEDICINE

## 2020-06-23 RX ORDER — SODIUM CHLORIDE 0.9 % (FLUSH) 0.9 %
10 SYRINGE (ML) INJECTION AS NEEDED
Status: DISCONTINUED | OUTPATIENT
Start: 2020-06-23 | End: 2020-06-23 | Stop reason: HOSPADM

## 2020-06-23 RX ORDER — FENTANYL CITRATE 50 UG/ML
INJECTION, SOLUTION INTRAMUSCULAR; INTRAVENOUS AS NEEDED
Status: DISCONTINUED | OUTPATIENT
Start: 2020-06-23 | End: 2020-06-23 | Stop reason: HOSPADM

## 2020-06-23 RX ORDER — LIDOCAINE HYDROCHLORIDE 10 MG/ML
0.1 INJECTION, SOLUTION EPIDURAL; INFILTRATION; INTRACAUDAL; PERINEURAL ONCE AS NEEDED
Status: DISCONTINUED | OUTPATIENT
Start: 2020-06-23 | End: 2020-06-23 | Stop reason: HOSPADM

## 2020-06-23 RX ORDER — SODIUM CHLORIDE 9 MG/ML
250 INJECTION, SOLUTION INTRAVENOUS ONCE AS NEEDED
Status: DISCONTINUED | OUTPATIENT
Start: 2020-06-23 | End: 2020-06-23 | Stop reason: HOSPADM

## 2020-06-23 RX ORDER — SODIUM CHLORIDE 0.9 % (FLUSH) 0.9 %
3 SYRINGE (ML) INJECTION EVERY 12 HOURS SCHEDULED
Status: DISCONTINUED | OUTPATIENT
Start: 2020-06-23 | End: 2020-06-23 | Stop reason: HOSPADM

## 2020-06-23 RX ORDER — SODIUM CHLORIDE 9 MG/ML
75 INJECTION, SOLUTION INTRAVENOUS CONTINUOUS
Status: DISCONTINUED | OUTPATIENT
Start: 2020-06-23 | End: 2020-06-23 | Stop reason: HOSPADM

## 2020-06-23 RX ORDER — ACETAMINOPHEN 325 MG/1
650 TABLET ORAL EVERY 4 HOURS PRN
Status: DISCONTINUED | OUTPATIENT
Start: 2020-06-23 | End: 2020-06-23 | Stop reason: HOSPADM

## 2020-06-23 RX ORDER — LIDOCAINE HYDROCHLORIDE 20 MG/ML
INJECTION, SOLUTION INFILTRATION; PERINEURAL AS NEEDED
Status: DISCONTINUED | OUTPATIENT
Start: 2020-06-23 | End: 2020-06-23 | Stop reason: HOSPADM

## 2020-06-23 RX ORDER — MIDAZOLAM HYDROCHLORIDE 1 MG/ML
INJECTION INTRAMUSCULAR; INTRAVENOUS AS NEEDED
Status: DISCONTINUED | OUTPATIENT
Start: 2020-06-23 | End: 2020-06-23 | Stop reason: HOSPADM

## 2020-06-23 RX ADMIN — SODIUM CHLORIDE 75 ML/HR: 9 INJECTION, SOLUTION INTRAVENOUS at 10:24

## 2020-06-23 NOTE — DISCHARGE INSTRUCTIONS
PLEASE RESUME YOUR METFORMIN ON 06/25/2020      [facility]  [address] [place] [address]      Coronary Angiogram (Femoral Approach) After Care     Refer to this sheet in the next few weeks. These instructions provide you with information on caring for yourself after your procedure. Your health care provider may also give you more specific instructions. Your treatment has been planned according to current medical practices, but problems sometimes occur. Call your health care provider if you have any problems or questions after your procedure.      What to Expect After the Procedure:  After your procedure, it is typical to have the following sensations:  · Minor discomfort or tenderness and a small bump at the catheter insertion site. The bump should usually decrease in size and tenderness within 1 to 2 weeks.  · Any bruising will usually fade within 2 to 4 weeks.  Home Care Instructions:  · Do not apply powder or lotion to the site.  · Do not take baths, swim, or use a hot tub until your health care provider approves and the site is completely healed.  · Do not bend, squat, or lift anything over 20 lb (9 kg) or as directed by your health care provider. However, we recommend lifting nothing heavier than a gallon of milk.    · You may shower 24 hours after the procedure. Remove the bandage (dressing) and gently wash the site with plain soap and water. Gently pat the site dry. You may apply a band aid daily for 2 days if desired.    · Inspect the site at least twice daily.  · Increase your fluid intake for the next 2 days.    · Limit your activity for the first 48 hours. .    · Avoid strenuous activity for 1 week or as advised by your physician.    · Follow instructions about when you can drive or return to work as directed by your physician.    · Hold direct pressure over the site when you cough, sneeze, laugh or change positions.  Do this for the next 2 days.    · Do not operate machinery or  power tools for 24 hours.  · A responsible adult should be with you for the first 24 hours after you arrive home. Do not make any important legal decisions or sign legal papers for 24 hours.  Do not drink alcohol for 24 hours.  · Metformin or any medications containing Metformin should not be taken for 48 hours after your procedure.    Call Your Doctor If:  · You have drainage (other than a small amount of blood on the dressing).  · You have chills or a fever > 101.  · You have redness, warmth, swelling(larger than a walnut), or pain at the insertion site  · .You have heavy bleeding from the site. If this happens, hold pressure on the site and call 911.  · You develop chest pain or shortness of breath, feel faint, or pass out.  · You develop pain, discoloration, coldness, numbness, tingling, or severe bruising in the leg that held the catheter.  · You develop bleeding from any other place, such as the bowels.    Make Sure You:  · Understand these instructions.  · Will watch your condition.  · Will get help right away if you are not doing well or get worse.

## 2020-06-23 NOTE — H&P
[facility] H&P        Subjective:     Encounter Date:06/04/2020      Patient ID: Carlton Núñez is a 74 y.o. male.      Chief Complaint:    HPI:He is a pleasant 74-year-old male with a primary complaint of generalized fatigue which has progressed as well as palpitations or irregular heartbeat.  He does not report chest pain pressure or tightness.  He does have a history of coronary heart disease status post coronary artery bypass surgery 1996 with a LIMA to the LAD and a KARIE to the right coronary artery.  His last noninvasive ischemic assessment was in 2016 with nuclear stress testing revealing a left ventricular ejection fraction 56% with mild to moderate inferior ischemia.  He underwent coronary angiography subsequently which revealed patent LIMA and KARIE grafts respectively to the LAD and right coronary artery echocardiogram obtained 5/18/2017 revealed preserved left ventricular function moderate aortic insufficiency and mild to moderate mitral insufficiency.  He does have dyspnea on exertion which he feels is progressive.  He sleeps with one pillow free of orthopnea or PND and no lower extremity edema.  He denies syncope or near syncope.  He does report increased palpitations or irregular heartbeat.  He has a history of hypertension which is controlled.  He has known dyslipidemia and is on pravastatin 10 mg daily.  His lipids are monitored by his primary care physician Dr. Dylan Chan.    Nuclear stress test obtained 5/29/2020 revealed left ventricular ejection fraction 67% with ischemia in the inferior and inferior apical region.         The following portions of the patient's history were reviewed and updated as appropriate: allergies, current medications, past family history, past medical history, past social history, past surgical history and problem list.      Past Medical History:   Diagnosis Date   • Allergy    • Atrial fibrillation (CMS/HCC)    • Colon cancer (CMS/HCC)    •  Coronary artery disease    • Heart attack (CMS/HCC)    • High cholesterol    • History of being hospitalized     Heart, colon cancer, hernia   • History of cardiac cath     11/28/2016 - Two vessel CAD with patent LIMA/KARIE graftsto LAD and RCA. Culprit vessel likely diagonal with 95% ostial lesion, too small for intervension, best treated medically. LCX 60% proximal but no flow limiting. EF 55-60%.   • History of echocardiogram     5/18/17 - Mild concentric LVH. EF 55-60%. Mildly dilated LA. Moderate AR. Mild to moderate MR. Mild TR ad DC   • History of nuclear stress test     11/10/16 - Mild inferior wall hypokinesis. Inferoapical wall hypokinesis. The overall EF is 56%. Study suggestive of prior inferior wall infarction with a mild to moderate area of ischemia remaining.   • Hyperlipidemia    • Hypertension    • Irregular heart beat    • Kidney stones    • Myocardial infarction (CMS/HCC)    • LUCY (obstructive sleep apnea)          Past Surgical History:   Procedure Laterality Date   • CATARACT EXTRACTION WITH INTRAOCULAR LENS IMPLANT Bilateral    • COLON RESECTION     • CORONARY ARTERY BYPASS GRAFT  12/01/1996    LIMA to LAD and KARIE to RCA, performed by Dr Barney.   • INGUINAL HERNIA REPAIR Bilateral    • SINUS SURGERY     • TONSILLECTOMY           Social History     Socioeconomic History   • Marital status:      Spouse name: Not on file   • Number of children: Not on file   • Years of education: Not on file   • Highest education level: Not on file   Tobacco Use   • Smoking status: Never Smoker   • Smokeless tobacco: Never Used   Substance and Sexual Activity   • Alcohol use: Yes   • Drug use: Never   • Sexual activity: Defer         Allergies   Allergen Reactions   • Contrast Dye Hives   • Phenergan [Promethazine Hcl] Nausea And Vomiting     .   • Hydralazine Dizziness   • Iodinated Diagnostic Agents Hives   • Promethazine GI Intolerance   • Clonidine Rash       Current Medications:   Scheduled  "Meds:  sodium chloride 3 mL Intravenous Q12H     Continuous Infusions:  sodium chloride 75 mL/hr Last Rate: 75 mL/hr (06/23/20 1024)       Review of Systems   Constitution: Positive for malaise/fatigue.   HENT: Negative for hearing loss and nosebleeds.    Eyes: Negative for double vision and visual disturbance.   Cardiovascular: Positive for dyspnea on exertion and palpitations. Negative for chest pain, claudication, near-syncope, orthopnea, paroxysmal nocturnal dyspnea and syncope.   Respiratory: Negative for cough, shortness of breath, sleep disturbances due to breathing, snoring, sputum production and wheezing.    Endocrine: Negative for cold intolerance, heat intolerance, polydipsia and polyuria.   Hematologic/Lymphatic: Negative for adenopathy and bleeding problem. Does not bruise/bleed easily.   Skin: Negative for flushing and itching.   Musculoskeletal: Negative for back pain, falls, joint pain, joint swelling, muscle weakness and neck pain.   Gastrointestinal: Negative for abdominal pain, dysphagia, heartburn, nausea and vomiting.   Genitourinary: Negative for dysuria and frequency.   Neurological: Negative for disturbances in coordination, dizziness, light-headedness, loss of balance, numbness and weakness.   Psychiatric/Behavioral: Negative for altered mental status and memory loss. The patient is not nervous/anxious.    Allergic/Immunologic: Negative for hives and persistent infections.          Objective:         /71 (BP Location: Right arm, Patient Position: Lying)   Pulse 79   Temp 98.6 °F (37 °C) (Temporal)   Resp 20   Ht 182.9 cm (72\")   Wt 90.7 kg (200 lb)   SpO2 97%   BMI 27.12 kg/m²     Physical Exam   Constitutional: He is oriented to person, place, and time. He appears well-developed and well-nourished.   HENT:   Head: Normocephalic and atraumatic.   Eyes: Pupils are equal, round, and reactive to light. Conjunctivae and EOM are normal.   Neck: Neck supple. No thyromegaly present. "   Cardiovascular: Normal rate, regular rhythm, S1 normal, S2 normal and intact distal pulses. PMI is not displaced. Exam reveals gallop and S4. Exam reveals no S3, no distant heart sounds, no friction rub, no midsystolic click and no opening snap.   No murmur heard.  Pulses:       Carotid pulses are 2+ on the right side, and 2+ on the left side.       Radial pulses are 2+ on the right side, and 2+ on the left side.        Femoral pulses are 2+ on the right side, and 2+ on the left side.       Dorsalis pedis pulses are 2+ on the right side, and 2+ on the left side.        Posterior tibial pulses are 2+ on the right side, and 2+ on the left side.   1/6 systolic murmur left sternal border   Pulmonary/Chest: Effort normal and breath sounds normal. No respiratory distress. He has no wheezes. He has no rales.   Bibasilar crackles   Abdominal: Soft. Bowel sounds are normal. He exhibits no distension and no mass. There is no tenderness.   Musculoskeletal: Normal range of motion. He exhibits no edema.   Neurological: He is alert and oriented to person, place, and time.   Skin: Skin is warm and dry. No erythema.   Psychiatric: He has a normal mood and affect.             ASCVD RIsk Score::  The ASCVD Risk score ([place] Jr., et al., 2013) failed to calculate for the following reasons:    Cannot find a previous HDL lab    Cannot find a previous total cholesterol lab      Lab Review:   not applicable     Results from last 7 days   Lab Units 06/18/20  1507   SODIUM mmol/L 138   POTASSIUM mmol/L 4.5   CHLORIDE mmol/L 100   CO2 mmol/L 23.0   BUN mg/dL 27*   CREATININE mg/dL 1.22   GLUCOSE mg/dL 103*   CALCIUM mg/dL 10.2   AST (SGOT) U/L 16   ALT (SGPT) U/L 18         Results from last 7 days   Lab Units 06/18/20  1507   WBC 10*3/mm3 8.86   HEMOGLOBIN g/dL 15.4   HEMATOCRIT % 45.5   PLATELETS 10*3/mm3 174     Results from last 7 days   Lab Units 06/18/20  1507   INR  0.98               Invalid input(s): LDLCALC            Recent  Radiology:  Imaging Results (Most Recent)     None          EKG:        Assessment:         Active Hospital Problems    Diagnosis POA   • Anginal equivalent (CMS/HCC) [I20.8] Unknown     Added automatically from request for surgery [id number]     • Fatigue [R53.83] Unknown   1. Coronary artery disease involving native coronary artery of native heart without angina pectoris  Fatigue respiratory insufficiency possible anginal equivalent     2. History of coronary artery bypass surgery        3. Myocardial infarction, unspecified MI type, unspecified artery (CMS/HCC)        4. Essential hypertension  Controlled     5. Hyperlipidemia, unspecified hyperlipidemia type  Continue pravastatin 10 mg daily     6. LUCY (obstructive sleep apnea)  Continue CPAP     7. Moderate aortic insufficiency  Presently compensated     8. Mitral valve insufficiency, unspecified etiology  Presently compensated     9. Fatigue, unspecified type  New onset possible anginal equivalent     10. Respiratory insufficiency  Progressive possible anginal equivalent     11. Palpitations  Mildly symptomatic     12.  Type 2 diabetes       Plan:       Mr. Núñez is a history of coronary heart disease status post previous coronary artery bypass surgery 1996 with a LIMA to the LAD and KARIE to the right coronary artery.  He reports generalized fatigue increased dyspnea on exertion and has an abnormal nuclear for possible inferior ischemia.  He presents today to undergo coronary angiography.  Risk benefits been explained patient understands and is prepared to proceed.  He has a history of contrast allergy, he has been treated pretreated for his allergy.             Alton Coronado MD  06/23/20  10:37

## 2020-09-16 ENCOUNTER — OFFICE VISIT (OUTPATIENT)
Dept: CARDIOLOGY | Facility: CLINIC | Age: 75
End: 2020-09-16

## 2020-09-16 VITALS
SYSTOLIC BLOOD PRESSURE: 112 MMHG | DIASTOLIC BLOOD PRESSURE: 60 MMHG | BODY MASS INDEX: 27.25 KG/M2 | RESPIRATION RATE: 18 BRPM | HEART RATE: 64 BPM | WEIGHT: 201.2 LBS | OXYGEN SATURATION: 97 % | HEIGHT: 72 IN

## 2020-09-16 DIAGNOSIS — Z95.1 HISTORY OF CORONARY ARTERY BYPASS SURGERY: ICD-10-CM

## 2020-09-16 DIAGNOSIS — G47.33 OSA (OBSTRUCTIVE SLEEP APNEA): ICD-10-CM

## 2020-09-16 DIAGNOSIS — E78.5 HYPERLIPIDEMIA, UNSPECIFIED HYPERLIPIDEMIA TYPE: ICD-10-CM

## 2020-09-16 DIAGNOSIS — E11.9 TYPE 2 DIABETES MELLITUS WITHOUT COMPLICATION, WITHOUT LONG-TERM CURRENT USE OF INSULIN (HCC): ICD-10-CM

## 2020-09-16 DIAGNOSIS — I25.10 CORONARY ARTERY DISEASE INVOLVING NATIVE CORONARY ARTERY OF NATIVE HEART WITHOUT ANGINA PECTORIS: Primary | ICD-10-CM

## 2020-09-16 DIAGNOSIS — I35.1 MODERATE AORTIC INSUFFICIENCY: ICD-10-CM

## 2020-09-16 DIAGNOSIS — I10 ESSENTIAL HYPERTENSION: ICD-10-CM

## 2020-09-16 PROBLEM — I20.8 ANGINAL EQUIVALENT (HCC): Status: RESOLVED | Noted: 2020-06-04 | Resolved: 2020-09-16

## 2020-09-16 PROBLEM — I20.89 ANGINAL EQUIVALENT: Status: RESOLVED | Noted: 2020-06-04 | Resolved: 2020-09-16

## 2020-09-16 PROCEDURE — 99214 OFFICE O/P EST MOD 30 MIN: CPT | Performed by: INTERNAL MEDICINE

## 2020-09-16 NOTE — PROGRESS NOTES
[facility]        Subjective:     Encounter Date:09/16/2020      Patient ID: Carlton Núñez is a 75 y.o. male.      HPI: I saw the Carlton Núñez today for cardiovascular care.  He is a very pleasant 75-year-old male who observes the CDC guidelines for social distancing.  He is free of fever cough or increased shortness of breath.  He does not report any change in his sense of smell or taste.  Mr. Núñez has known coronary heart disease.  He underwent coronary artery bypass surgery 1996 with a LIMA to the LAD and a KARIE to the right coronary artery.  He recently underwent coronary angiography 6/23/2020.  This revealed widely patent LIMA to the LAD and KARIE to the right coronary no other significant obstructive disease.  Echocardiogram was obtained 5/29/2020 revealing left ventricular ejection fraction 65% with mild to moderate aortic insufficiency and mild tricuspid insufficiency.  Mr. Núñez is in good spirits and feels well.  He is free of angina and does not report any significant or progressive dyspnea on exertion.  He denies orthopnea or PND no lower extremity edema.  He is free of syncope or palpitations.  He does report very mild and infrequent orthostatic dizziness.  He has a history of hypertension which is well controlled.  He has known dyslipidemia and remains on pravastatin 10 mg daily.  He has a history of obstructive sleep apnea and uses CPAP.  He has type 2 diabetes controlled with diet and oral agents.      The following portions of the patient's history were reviewed and updated as appropriate: allergies, current medications, past family history, past medical history, past social history, past surgical history and problem list.    Problem List:  Patient Active Problem List   Diagnosis   • Hyperlipidemia   • LUCY (obstructive sleep apnea)   • Hypertension   • Myocardial infarction (CMS/[facility])   • Coronary artery disease   • Colon cancer (CMS/[facility])   • History of echocardiogram    • History of nuclear stress test   • History of cardiac cath   • History of coronary artery bypass surgery   • Moderate aortic insufficiency   • Mitral valve insufficiency   • Fatigue   • Respiratory insufficiency   • Palpitations   • Abnormal nuclear stress test       Past Medical History:  Past Medical History:   Diagnosis Date   • Allergy    • Atrial fibrillation (CMS/HCC)    • Colon cancer (CMS/HCC)    • Coronary artery disease    • Heart attack (CMS/HCC)    • High cholesterol    • History of being hospitalized     Heart, colon cancer, hernia   • History of cardiac cath     11/28/2016 - Two vessel CAD with patent LIMA/KARIE graftsto LAD and RCA. Culprit vessel likely diagonal with 95% ostial lesion, too small for intervension, best treated medically. LCX 60% proximal but no flow limiting. EF 55-60%.   • History of echocardiogram     5/18/17 - Mild concentric LVH. EF 55-60%. Mildly dilated LA. Moderate AR. Mild to moderate MR. Mild TR ad WI   • History of nuclear stress test     11/10/16 - Mild inferior wall hypokinesis. Inferoapical wall hypokinesis. The overall EF is 56%. Study suggestive of prior inferior wall infarction with a mild to moderate area of ischemia remaining.   • Hyperlipidemia    • Hypertension    • Irregular heart beat    • Kidney stones    • Myocardial infarction (CMS/HCC)    • LUCY (obstructive sleep apnea)        Past Surgical History:  Past Surgical History:   Procedure Laterality Date   • CARDIAC CATHETERIZATION N/A 6/23/2020    Procedure: Left Heart Cath;  Surgeon: Alton Coronado MD;  Location: [facility] INVASIVE LOCATION;  Service: Cardiovascular;  Laterality: N/A;   • CARDIAC CATHETERIZATION N/A 6/23/2020    Procedure: Coronary angiography;  Surgeon: Alton Coronado MD;  Location: [facility] INVASIVE LOCATION;  Service: Cardiovascular;  Laterality: N/A;   • CARDIAC CATHETERIZATION N/A 6/23/2020    Procedure: Left ventriculography;  Surgeon: Alton Coronado MD;  Location: [facility]  CATH INVASIVE LOCATION;  Service: Cardiovascular;  Laterality: N/A;   • CARDIAC CATHETERIZATION N/A 6/23/2020    Procedure: Native mammary injection;  Surgeon: Alton Coronado MD;  Location: [facility] CATH INVASIVE LOCATION;  Service: Cardiovascular;  Laterality: N/A;   • CATARACT EXTRACTION WITH INTRAOCULAR LENS IMPLANT Bilateral    • COLON RESECTION     • CORONARY ARTERY BYPASS GRAFT  12/01/1996    LIMA to LAD and KARIE to RCA, performed by Dr Barney.   • INGUINAL HERNIA REPAIR Bilateral    • SINUS SURGERY     • TONSILLECTOMY         Social History:  Social History     Socioeconomic History   • Marital status:      Spouse name: Not on file   • Number of children: Not on file   • Years of education: Not on file   • Highest education level: Not on file   Tobacco Use   • Smoking status: Never Smoker   • Smokeless tobacco: Never Used   Substance and Sexual Activity   • Alcohol use: Yes   • Drug use: Never   • Sexual activity: Defer       Allergies:  Allergies   Allergen Reactions   • Contrast Dye Hives   • Phenergan [Promethazine Hcl] Nausea And Vomiting     .   • Hydralazine Dizziness   • Iodinated Diagnostic Agents Hives   • Promethazine GI Intolerance   • Clonidine Rash         ROS:  Review of Systems   Constitution: Negative for malaise/fatigue.   HENT: Negative for hearing loss and nosebleeds.    Eyes: Negative for double vision and visual disturbance.   Cardiovascular: Negative for chest pain, claudication, dyspnea on exertion, near-syncope, orthopnea, palpitations, paroxysmal nocturnal dyspnea and syncope.        Rare orthostatic dizziness   Respiratory: Negative for cough, shortness of breath, sleep disturbances due to breathing, snoring, sputum production and wheezing.    Endocrine: Negative for cold intolerance, heat intolerance, polydipsia and polyuria.   Hematologic/Lymphatic: Negative for adenopathy and bleeding problem. Does not bruise/bleed easily.   Skin: Negative for flushing and itching.  "  Musculoskeletal: Negative for back pain, falls, joint pain, joint swelling, muscle weakness and neck pain.   Gastrointestinal: Negative for abdominal pain, dysphagia, heartburn, nausea and vomiting.   Genitourinary: Negative for dysuria and frequency.   Neurological: Negative for disturbances in coordination, dizziness, light-headedness, loss of balance, numbness and weakness.   Psychiatric/Behavioral: Negative for altered mental status and memory loss. The patient is not nervous/anxious.    Allergic/Immunologic: Negative for hives and persistent infections.          Objective:         /60 (BP Location: Left arm, Patient Position: Sitting, Cuff Size: Large Adult)   Pulse 64   Resp 18   Ht 182.9 cm (72\")   Wt 91.3 kg (201 lb 3.2 oz)   SpO2 97%   BMI 27.29 kg/m²     Constitutional:       Appearance: Well-developed.   Eyes:      Conjunctiva/sclera: Conjunctivae normal.      Pupils: Pupils are equal, round, and reactive to light.   HENT:      Head: Normocephalic and atraumatic.   Neck:      Musculoskeletal: Neck supple.      Thyroid: No thyromegaly.   Pulmonary:      Effort: Pulmonary effort is normal. No respiratory distress.      Breath sounds: Normal breath sounds. No wheezing. No rales.   Cardiovascular:      Normal rate. Regular rhythm.      Murmurs: There is a grade 1/6 crescendo-decrescendo midsystolic murmur at the URSB. There is a grade 1/4 high frequency blowing decrescendo, early diastolic murmur at the URSB, radiating to the apex.      S4 Gallop. No S3 gallop. Midsystolic click click.   Edema:     Peripheral edema absent.   Abdominal:      General: Bowel sounds are normal. There is no distension.      Palpations: Abdomen is soft. There is no abdominal mass.      Tenderness: There is no abdominal tenderness.   Musculoskeletal: Normal range of motion.   Skin:     General: Skin is warm and dry.      Findings: No erythema.   Neurological:      Mental Status: Alert and oriented to person, place, and " time.         In-Office Procedure(s):  Procedures    ASCVD RIsk Score::  The ASCVD Risk score ([place] CRISTIN Jr., et al., 2013) failed to calculate for the following reasons:    Cannot find a previous HDL lab    Cannot find a previous total cholesterol lab        Assessment/Plan:         1. Coronary artery disease involving native coronary artery of native heart without angina pectoris  Stable free of angina    2. History of coronary artery bypass surgery  Stable    3. LUCY (obstructive sleep apnea)  Continue CPAP    4. Essential hypertension  Controlled    5. Hyperlipidemia, unspecified hyperlipidemia type  Stable    6. Moderate aortic insufficiency  Compensated    7. Type 2 diabetes mellitus without complication, without long-term current use of insulin (CMS/[facility])  Stable    Mr. Núñez has maintained his activity level and is free of angina and remains euvolemic.  I reviewed with him his recent coronary angiogram and echocardiogram.  We discussed reducing his antihypertensive medication but he feels that his orthostatic dizziness is minimal at present.  Should his orthostatic symptoms progress we can reduce his amlodipine to 5 mg daily.  I have counseled him on continued risk modification I will see him in follow-up in 6 months or sooner if needed.           Alton Coronado MD  09/16/20  .

## 2021-03-16 PROBLEM — I35.1 MODERATE AORTIC INSUFFICIENCY: Chronic | Status: ACTIVE | Noted: 2019-09-11

## 2021-03-16 PROBLEM — I34.0 MITRAL VALVE INSUFFICIENCY: Chronic | Status: ACTIVE | Noted: 2019-09-11

## 2021-03-16 PROBLEM — R00.2 PALPITATIONS: Chronic | Status: ACTIVE | Noted: 2020-03-11

## 2021-03-16 PROBLEM — Z95.1 HISTORY OF CORONARY ARTERY BYPASS SURGERY: Chronic | Status: ACTIVE | Noted: 2019-09-11

## 2021-03-16 PROBLEM — R06.89 RESPIRATORY INSUFFICIENCY: Chronic | Status: ACTIVE | Noted: 2020-03-11

## 2021-03-17 ENCOUNTER — OFFICE VISIT (OUTPATIENT)
Dept: CARDIOLOGY | Facility: CLINIC | Age: 76
End: 2021-03-17

## 2021-03-17 VITALS
HEIGHT: 72 IN | OXYGEN SATURATION: 97 % | DIASTOLIC BLOOD PRESSURE: 64 MMHG | RESPIRATION RATE: 18 BRPM | WEIGHT: 205 LBS | BODY MASS INDEX: 27.77 KG/M2 | HEART RATE: 66 BPM | SYSTOLIC BLOOD PRESSURE: 120 MMHG

## 2021-03-17 DIAGNOSIS — I25.10 CORONARY ARTERY DISEASE INVOLVING NATIVE CORONARY ARTERY OF NATIVE HEART WITHOUT ANGINA PECTORIS: Primary | ICD-10-CM

## 2021-03-17 DIAGNOSIS — G47.33 OSA (OBSTRUCTIVE SLEEP APNEA): ICD-10-CM

## 2021-03-17 DIAGNOSIS — I35.1 MODERATE AORTIC INSUFFICIENCY: Chronic | ICD-10-CM

## 2021-03-17 DIAGNOSIS — I10 ESSENTIAL HYPERTENSION: ICD-10-CM

## 2021-03-17 DIAGNOSIS — E78.5 HYPERLIPIDEMIA, UNSPECIFIED HYPERLIPIDEMIA TYPE: ICD-10-CM

## 2021-03-17 DIAGNOSIS — Z95.1 HISTORY OF CORONARY ARTERY BYPASS SURGERY: Chronic | ICD-10-CM

## 2021-03-17 DIAGNOSIS — E11.9 TYPE 2 DIABETES MELLITUS WITHOUT COMPLICATION, WITHOUT LONG-TERM CURRENT USE OF INSULIN (HCC): ICD-10-CM

## 2021-03-17 PROBLEM — I34.0 MITRAL VALVE INSUFFICIENCY: Chronic | Status: RESOLVED | Noted: 2019-09-11 | Resolved: 2021-03-17

## 2021-03-17 PROCEDURE — 99214 OFFICE O/P EST MOD 30 MIN: CPT | Performed by: INTERNAL MEDICINE

## 2021-03-17 RX ORDER — UBIDECARENONE 100 MG
200 CAPSULE ORAL DAILY
COMMUNITY

## 2021-03-17 NOTE — PROGRESS NOTES
"Chief Complaint  Coronary Artery Disease, Hypertension, Hyperlipidemia, Cardiac Valve Problem (AR), and Sleep Apnea    Subjective    History of Present Illness      I saw Carlton Núñez today for continued cardiovascular care.  He is a very pleasant 75-year-old male who observes the CDC guidelines during the coronavirus pandemic.  He has received his COVID-19 vaccine.  Mr. Núñez is in good spirits and denies any chest pain pressure or tightness.  He is free of any progressive dyspnea on exertion and does not report orthopnea PND or lower extremity edema.  He denies syncope near syncope or palpitation.  I reviewed with him his echocardiogram from 5/29/2020.  This demonstrated preserved left ventricular function mild to moderate aortic insufficiency.  His nuclear stress test from that same date also demonstrates preserved left ventricular function but suggested mild inferior ischemia.  He subsequently underwent coronary angiography 6/25/2020.  This demonstrated both the LIMA to the LAD and a KARIE to the right coronary artery to be widely patent.  Otherwise there was nonobstructive disease.  He continues on his current medical regimen which he tolerates well.  He has a history of hyperlipidemia and remains on pravastatin 10 mg daily and Vascepa 2 g twice daily.  His lipids are monitored by his primary care physician Dr. Finn Chan.  He has type 2 diabetes managed by diet and Metformin.  His blood pressure is controlled at 120/64.  He continues to tolerate his medical regimen well no reported side effects.  He has obstructive sleep apnea and uses CPAP routinely.    Objective   Vital Signs:   /64 (BP Location: Left arm, Patient Position: Sitting, Cuff Size: Large Adult)   Pulse 66   Resp 18   Ht 182.9 cm (72\")   Wt 93 kg (205 lb)   SpO2 97%   BMI 27.80 kg/m²     Constitutional:       Appearance: Well-developed.   Eyes:      Conjunctiva/sclera: Conjunctivae normal.      Pupils: Pupils are equal, round, and " reactive to light.   HENT:      Head: Normocephalic and atraumatic.   Neck:      Thyroid: No thyromegaly.   Pulmonary:      Effort: Pulmonary effort is normal. No respiratory distress.      Breath sounds: Normal breath sounds. No wheezing. No rales.   Cardiovascular:      Normal rate. Regular rhythm.      Murmurs: There is a grade 1/4 high frequency blowing decrescendo, early diastolic murmur at the URSB, radiating to the apex.      S4 Gallop. No S3 gallop. Midsystolic click click.   Edema:     Peripheral edema absent.   Abdominal:      General: Bowel sounds are normal. There is no distension.      Palpations: Abdomen is soft. There is no abdominal mass.      Tenderness: There is no abdominal tenderness.   Musculoskeletal: Normal range of motion.      Cervical back: Neck supple. Skin:     General: Skin is warm and dry.      Findings: No erythema.   Neurological:      Mental Status: Alert and oriented to person, place, and time.         Result Review :     Common labs    Common Labsle 6/18/20 6/18/20    1507 1507   Glucose  103 (A)   BUN  27 (A)   Creatinine  1.22   eGFR Non African Am  58 (A)   Sodium  138   Potassium  4.5   Chloride  100   Calcium  10.2   Albumin  4.50   Total Bilirubin  0.6   Alkaline Phosphatase  69   AST (SGOT)  16   ALT (SGPT)  18   WBC 8.86    Hemoglobin 15.4    Hematocrit 45.5    Platelets 174    (A) Abnormal value            Data reviewed: Cardiology studies Echocardiogram 5/29/2020, nuclear stress test 5/29/2020, coronary angiography 6/23/2020          Assessment and Plan    1. Coronary artery disease involving native coronary artery of native heart without angina pectoris  Stable free of angina    2. History of coronary artery bypass surgery  Stable, LIMA to LAD and KARIE to right coronary artery patent, continue aspirin 81 mg daily    3. Essential hypertension  Controlled    4. Hyperlipidemia, unspecified hyperlipidemia type  Continue pravastatin and Vascepa    5. Type 2 diabetes mellitus  without complication, without long-term current use of insulin (CMS/[facility])  Diet and metformin control    6. Moderate aortic insufficiency  Compensated    7. LUCY (obstructive sleep apnea)  Continue CPAP    Mr. Núñez remains active but observes the CDC guidelines.  He is free of any chest pain pressure tightness or significant respiratory insufficiency.  He tolerates his medications well no reported side effects.  I will see him in follow-up in 6 months.  I encouraged him to observe a low-cholesterol low saturated fat diet.        Follow Up   No follow-ups on file.  Patient was given instructions and counseling regarding his condition or for health maintenance advice. Please see specific information pulled into the AVS if appropriate.

## 2021-10-22 NOTE — PROGRESS NOTES
"Chief Complaint  No chief complaint on file.    Subjective    History of Present Illness      I saw the [facility] today for cardiovascular care.  He is a pleasant 76-year-old male who denies chest pain pressure or tightness.  He has coronary heart disease and has undergone previous coronary artery bypass surgery.  Coronary angiography 6/25/2020 revealed a patent LIMA to the LAD and KARIE to the right coronary artery otherwise nonobstructive disease.  He does not report any progressive dyspnea on exertion.  He is free of orthopnea or PND no lower extremity edema.  He denies syncope or palpitation.  He does report orthostatic dizziness as blood pressure is noted to be 114/60 with a pulse of 60.  He has had a 5 pound weight reduction.  He has a history of hyperlipidemia and remains on Vascepa 2 g twice daily and pravastatin 20 mg daily    Objective   Vital Signs:   /60   Pulse 60   Ht 182.9 cm (72\")   Wt 90.7 kg (200 lb)   BMI 27.12 kg/m²     Constitutional:       Appearance: Well-developed.   Eyes:      Conjunctiva/sclera: Conjunctivae normal.      Pupils: Pupils are equal, round, and reactive to light.   HENT:      Head: Normocephalic and atraumatic.   Neck:      Thyroid: No thyromegaly.   Pulmonary:      Effort: Pulmonary effort is normal. No respiratory distress.      Breath sounds: Normal breath sounds. No wheezing. No rales.   Cardiovascular:      Normal rate. Regular rhythm.      Murmurs: There is a grade 1/4 high frequency blowing decrescendo, early diastolic murmur at the URSB, radiating to the apex.      No gallop. No S3 and S4 gallop. No rub.   Edema:     Peripheral edema absent.   Abdominal:      General: Bowel sounds are normal. There is no distension.      Palpations: Abdomen is soft. There is no abdominal mass.      Tenderness: There is no abdominal tenderness.   Musculoskeletal: Normal range of motion.      Cervical back: Neck supple. Skin:     General: Skin is warm and dry.      " Findings: No erythema.   Neurological:      Mental Status: Alert and oriented to person, place, and time.         Result Review :                   Assessment and Plan    1. Coronary artery disease involving native coronary artery of native heart without angina pectoris  Stable    2. History of coronary artery bypass surgery  Stable    3. Essential hypertension  Controlled    4. Hyperlipidemia, unspecified hyperlipidemia type  Stable    5. Type 2 diabetes mellitus without complication, without long-term current use of insulin (HCC)  Stable    6. Moderate aortic insufficiency  Compensated    7. LUCY (obstructive sleep apnea)  Continue CPAP    8. Orthostatic dizziness  Moderately symptomatic    Carlton is free of angina and appears euvolemic on physical examination.  He does report symptoms consistent with orthostatic dizziness that are recurrent.  I will reduce his metoprolol succinate from 50 to 25 mg daily.  He will report back if his orthostatic symptoms persist.  I will otherwise see him in follow-up in 6 months.  I counseled him to continue a low-cholesterol low saturated fat diet and continue his current medical regimen otherwise.        Follow Up   No follow-ups on file.  Patient was given instructions and counseling regarding his condition or for health maintenance advice. Please see specific information pulled into the AVS if appropriate.

## 2021-10-25 ENCOUNTER — OFFICE VISIT (OUTPATIENT)
Dept: CARDIOLOGY | Facility: CLINIC | Age: 76
End: 2021-10-25

## 2021-10-25 VITALS
WEIGHT: 200 LBS | SYSTOLIC BLOOD PRESSURE: 114 MMHG | BODY MASS INDEX: 27.09 KG/M2 | DIASTOLIC BLOOD PRESSURE: 60 MMHG | HEART RATE: 60 BPM | HEIGHT: 72 IN

## 2021-10-25 DIAGNOSIS — I35.1 MODERATE AORTIC INSUFFICIENCY: ICD-10-CM

## 2021-10-25 DIAGNOSIS — I25.10 CORONARY ARTERY DISEASE INVOLVING NATIVE CORONARY ARTERY OF NATIVE HEART WITHOUT ANGINA PECTORIS: Primary | ICD-10-CM

## 2021-10-25 DIAGNOSIS — E78.5 HYPERLIPIDEMIA, UNSPECIFIED HYPERLIPIDEMIA TYPE: ICD-10-CM

## 2021-10-25 DIAGNOSIS — Z95.1 HISTORY OF CORONARY ARTERY BYPASS SURGERY: ICD-10-CM

## 2021-10-25 DIAGNOSIS — R42 ORTHOSTATIC DIZZINESS: ICD-10-CM

## 2021-10-25 DIAGNOSIS — I10 ESSENTIAL HYPERTENSION: ICD-10-CM

## 2021-10-25 DIAGNOSIS — E11.9 TYPE 2 DIABETES MELLITUS WITHOUT COMPLICATION, WITHOUT LONG-TERM CURRENT USE OF INSULIN (HCC): ICD-10-CM

## 2021-10-25 DIAGNOSIS — G47.33 OSA (OBSTRUCTIVE SLEEP APNEA): ICD-10-CM

## 2021-10-25 PROCEDURE — 99214 OFFICE O/P EST MOD 30 MIN: CPT | Performed by: INTERNAL MEDICINE

## 2021-11-30 ENCOUNTER — LAB REQUISITION (OUTPATIENT)
Dept: LAB | Facility: HOSPITAL | Age: 76
End: 2021-11-30

## 2021-11-30 DIAGNOSIS — Z00.00 ENCOUNTER FOR GENERAL ADULT MEDICAL EXAMINATION WITHOUT ABNORMAL FINDINGS: ICD-10-CM

## 2021-11-30 LAB — SARS-COV-2 ORF1AB RESP QL NAA+PROBE: NOT DETECTED

## 2021-11-30 PROCEDURE — U0004 COV-19 TEST NON-CDC HGH THRU: HCPCS | Performed by: INTERNAL MEDICINE

## 2021-12-02 ENCOUNTER — AMBULATORY SURGICAL CENTER (OUTPATIENT)
Dept: URBAN - METROPOLITAN AREA SURGERY 20 | Facility: SURGERY | Age: 76
End: 2021-12-02

## 2021-12-02 DIAGNOSIS — K63.89 OTHER SPECIFIED DISEASES OF INTESTINE: ICD-10-CM

## 2021-12-02 DIAGNOSIS — Z98.0 INTESTINAL BYPASS AND ANASTOMOSIS STATUS: ICD-10-CM

## 2021-12-02 DIAGNOSIS — Z85.038 PERSONAL HISTORY OF OTHER MALIGNANT NEOPLASM OF LARGE INTEST: ICD-10-CM

## 2021-12-02 PROCEDURE — 45378 DIAGNOSTIC COLONOSCOPY: CPT | Mod: 33 | Performed by: INTERNAL MEDICINE

## 2022-05-24 NOTE — PROGRESS NOTES
"Chief Complaint  Coronary Artery Disease, Cardiac Valve Problem (AI), Hypertension, and Hyperlipidemia    Subjective    History of Present Illness      I saw the [facility] today for cardiovascular care.  He is a pleasant 76-year-old male who has known coronary heart disease status post previous coronary artery bypass surgery.  Coronary angiography 6/25/2020 revealed patent LIMA to the LAD and KARIE to the right coronary artery.  There was otherwise nonobstructive coronary disease.  He remains free of angina.  He does not report any significant dyspnea on exertion.  He is free of orthopnea or PND no lower extremity edema.  He denies syncope and reports rare palpitations.  He does report symptoms of dizziness most consistent with vertigo in the recent past.  He states that this seems to have resolved.Echocardiogram from 5/29/2020 reveals left ventricular ejection fraction 65%, mild to moderate aortic regurgitation, right ventricular systolic pressure less than 35 mmHg.    Blood pressure lying 150/74 heart rate 68, sitting 130/64 heart rate 78, standing 134/70 heart rate 88 and no dizziness    Objective   Vital Signs:   Resp 18   Ht 182.9 cm (72\")   Wt 87.5 kg (193 lb)   SpO2 98%   BMI 26.18 kg/m²     Constitutional:       Appearance: Well-developed.   Eyes:      Conjunctiva/sclera: Conjunctivae normal.      Pupils: Pupils are equal, round, and reactive to light.   HENT:      Head: Normocephalic and atraumatic.   Neck:      Thyroid: No thyromegaly.   Pulmonary:      Effort: Pulmonary effort is normal. No respiratory distress.      Breath sounds: Normal breath sounds. No wheezing. No rales.   Cardiovascular:      Normal rate. Regular rhythm.      Murmurs: There is a grade 1/4 high frequency blowing decrescendo, early diastolic murmur at the URSB, radiating to the apex.      No gallop. No S3 and S4 gallop. No rub.   Edema:     Peripheral edema absent.   Abdominal:      General: Bowel sounds are normal. There is " no distension.      Palpations: Abdomen is soft. There is no abdominal mass.      Tenderness: There is no abdominal tenderness.   Musculoskeletal: Normal range of motion.      Cervical back: Neck supple. Skin:     General: Skin is warm and dry.      Findings: No erythema.   Neurological:      Mental Status: Alert and oriented to person, place, and time.         Result Review :              ECG 12 Lead    Date/Time: 5/25/2022 4:32 PM  Performed by: Alton Coronado MD  Authorized by: Alton Coronado MD   Comparison: compared with previous ECG from 1/15/2020  Comparison to previous ECG: PVC is new  Rhythm: sinus rhythm  Ectopy: atrial premature contractions and infrequent PVCs  BPM: 76  Conduction: left anterior fascicular block  Other findings: non-specific ST-T wave changes              Assessment and Plan    1. Coronary artery disease involving native coronary artery of native heart without angina pectoris  Stable free of angina    2. History of coronary artery bypass surgery  LIMA to LAD and KARIE to right coronary    3. Moderate aortic insufficiency  Compensated    4. Primary hypertension  Stable    5. Hyperlipidemia, unspecified hyperlipidemia type  Stable    6. LUCY (obstructive sleep apnea)  CPAP    7. Palpitations  Stable    Carlton is free of angina and euvolemic on examination.  His blood pressure is controlled and he tolerates his medications well.  I suspect his dizziness was vertigo.  I made no changes in his medications at the present time.  Have encouraged him to observe a low-cholesterol restricted salt diet.  I will see him in follow-up in 6 months.  If he has further dizziness he will contact us.        Follow Up   No follow-ups on file.  Patient was given instructions and counseling regarding his condition or for health maintenance advice. Please see specific information pulled into the AVS if appropriate.

## 2022-05-25 ENCOUNTER — OFFICE VISIT (OUTPATIENT)
Dept: CARDIOLOGY | Facility: CLINIC | Age: 77
End: 2022-05-25

## 2022-05-25 VITALS — WEIGHT: 193 LBS | OXYGEN SATURATION: 98 % | HEIGHT: 72 IN | RESPIRATION RATE: 18 BRPM | BODY MASS INDEX: 26.14 KG/M2

## 2022-05-25 DIAGNOSIS — G47.33 OSA (OBSTRUCTIVE SLEEP APNEA): ICD-10-CM

## 2022-05-25 DIAGNOSIS — I25.10 CORONARY ARTERY DISEASE INVOLVING NATIVE CORONARY ARTERY OF NATIVE HEART WITHOUT ANGINA PECTORIS: Primary | ICD-10-CM

## 2022-05-25 DIAGNOSIS — I10 PRIMARY HYPERTENSION: ICD-10-CM

## 2022-05-25 DIAGNOSIS — I35.1 MODERATE AORTIC INSUFFICIENCY: ICD-10-CM

## 2022-05-25 DIAGNOSIS — R00.2 PALPITATIONS: ICD-10-CM

## 2022-05-25 DIAGNOSIS — E78.5 HYPERLIPIDEMIA, UNSPECIFIED HYPERLIPIDEMIA TYPE: ICD-10-CM

## 2022-05-25 DIAGNOSIS — Z95.1 HISTORY OF CORONARY ARTERY BYPASS SURGERY: ICD-10-CM

## 2022-05-25 PROCEDURE — 93000 ELECTROCARDIOGRAM COMPLETE: CPT | Performed by: INTERNAL MEDICINE

## 2022-05-25 PROCEDURE — 99214 OFFICE O/P EST MOD 30 MIN: CPT | Performed by: INTERNAL MEDICINE

## 2022-10-21 ENCOUNTER — OFFICE VISIT (OUTPATIENT)
Dept: CARDIOLOGY | Facility: CLINIC | Age: 77
End: 2022-10-21

## 2022-10-21 VITALS
SYSTOLIC BLOOD PRESSURE: 172 MMHG | DIASTOLIC BLOOD PRESSURE: 74 MMHG | WEIGHT: 194.8 LBS | BODY MASS INDEX: 26.38 KG/M2 | HEART RATE: 63 BPM | HEIGHT: 72 IN

## 2022-10-21 DIAGNOSIS — Z95.1 HISTORY OF CORONARY ARTERY BYPASS SURGERY: Chronic | ICD-10-CM

## 2022-10-21 DIAGNOSIS — I25.10 CORONARY ARTERY DISEASE INVOLVING NATIVE CORONARY ARTERY OF NATIVE HEART WITHOUT ANGINA PECTORIS: Primary | Chronic | ICD-10-CM

## 2022-10-21 PROCEDURE — 93000 ELECTROCARDIOGRAM COMPLETE: CPT | Performed by: INTERNAL MEDICINE

## 2022-10-21 PROCEDURE — 99204 OFFICE O/P NEW MOD 45 MIN: CPT | Performed by: INTERNAL MEDICINE

## 2022-10-21 RX ORDER — PRAVASTATIN SODIUM 20 MG
20 TABLET ORAL DAILY
COMMUNITY

## 2022-10-21 NOTE — PROGRESS NOTES
Date of Office Visit: 10/21/22  Encounter Provider: Frank Jim MD  Place of Service: [facility]  Patient Name: Carlton Núñez  :1945  [id number]    Chief Complaint   Patient presents with   • Coronary Artery Disease   :     HPI: Carlton Núñez is a 77 y.o. male is coming as a new patient to see me today he previously did see Alton Jackson he had a two-vessel bypass in the  with Robert ireland.  He had a cath in  both mammary arteries were used and both were patent circumflex was really free of disease in the past he has had normal LV function.  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  He does not have any angina or shortness of breath no PND orthopnea edema syncope or palpitations he does not have diabetes does not smoke he does have hypertension he thinks his pressure is usually better than what we are getting here today no other complaints    Past Medical History:   Diagnosis Date   • Allergy    • Atrial fibrillation (HCC)    • Colon cancer (HCC)    • Coronary artery disease    • Heart attack (HCC)    • High cholesterol    • History of being hospitalized     Heart, colon cancer, hernia   • History of cardiac cath     2016 - Two vessel CAD with patent LIMA/KARIE graftsto LAD and RCA. Culprit vessel likely diagonal with 95% ostial lesion, too small for intervension, best treated medically. LCX 60% proximal but no flow limiting. EF 55-60%.   • History of echocardiogram     17 - Mild concentric LVH. EF 55-60%. Mildly dilated LA. Moderate AR. Mild to moderate MR. Mild TR ad KS   • History of nuclear stress test     11/10/16 - Mild inferior wall hypokinesis. Inferoapical wall hypokinesis. The overall EF is 56%. Study suggestive of prior inferior wall infarction with a mild to moderate area of ischemia remaining.   • Hyperlipidemia    • Hypertension    • Irregular heart beat    • Kidney stones    • Myocardial  infarction (HCC)    • LUCY (obstructive sleep apnea)        Past Surgical History:   Procedure Laterality Date   • CARDIAC CATHETERIZATION N/A 6/23/2020    Procedure: Left Heart Cath;  Surgeon: Alton Coronado MD;  Location:  [facility];  Service: Cardiovascular;  Laterality: N/A;   • CARDIAC CATHETERIZATION N/A 6/23/2020    Procedure: Coronary angiography;  Surgeon: Alton Coronado MD;  Location:  [facility];  Service: Cardiovascular;  Laterality: N/A;   • CARDIAC CATHETERIZATION N/A 6/23/2020    Procedure: Left ventriculography;  Surgeon: Alton Coronado MD;  Location:  [facility];  Service: Cardiovascular;  Laterality: N/A;   • CARDIAC CATHETERIZATION N/A 6/23/2020    Procedure: Native mammary injection;  Surgeon: Alton Coronado MD;  [facility];  Service: Cardiovascular;  Laterality: N/A;   • CATARACT EXTRACTION WITH INTRAOCULAR LENS IMPLANT Bilateral    • COLON RESECTION     • CORONARY ARTERY BYPASS GRAFT  12/01/1996    LIMA to LAD and KARIE to RCA, performed by Dr Barney.   • INGUINAL HERNIA REPAIR Bilateral    • SINUS SURGERY     • TONSILLECTOMY         Social History     Socioeconomic History   • Marital status:    Tobacco Use   • Smoking status: Never   • Smokeless tobacco: Never   Substance and Sexual Activity   • Alcohol use: Yes   • Drug use: Never   • Sexual activity: Defer       Family History   Problem Relation Age of Onset   • Cancer Other    • Heart attack Other    • Hypertension Other        Review of Systems   Constitutional: Negative for decreased appetite, fever, malaise/fatigue and weight loss.   HENT: Negative for nosebleeds.    Eyes: Negative for double vision.   Cardiovascular: Negative for chest pain, claudication, cyanosis, dyspnea on exertion, irregular heartbeat, leg swelling, near-syncope, orthopnea, palpitations, paroxysmal nocturnal dyspnea and syncope.   Respiratory: Negative for cough,  hemoptysis and shortness of breath.    Hematologic/Lymphatic: Negative for bleeding problem.   Skin: Negative for rash.   Musculoskeletal: Negative for falls and myalgias.   Gastrointestinal: Negative for hematochezia, jaundice, melena, nausea and vomiting.   Genitourinary: Negative for hematuria.   Neurological: Negative for dizziness and seizures.   Psychiatric/Behavioral: Negative for altered mental status and memory loss.       Allergies   Allergen Reactions   • Contrast Dye Hives   • Phenergan [Promethazine Hcl] Nausea And Vomiting     .   • Hydralazine Dizziness   • Iodinated Diagnostic Agents Hives   • Promethazine GI Intolerance   • Clonidine Rash         Current Outpatient Medications:   •  amLODIPine (NORVASC) 5 MG tablet, Take 5 mg by mouth Daily., Disp: , Rfl:   •  aspirin 81 MG tablet, Take 81 mg by mouth Daily., Disp: , Rfl:   •  coenzyme Q10 100 MG capsule, Take 200 mg by mouth Daily., Disp: , Rfl:   •  empagliflozin (JARDIANCE) 10 MG tablet tablet, Take  by mouth. Take one tablet daily, Disp: , Rfl:   •  hydrochlorothiazide (HYDRODIURIL) 25 MG tablet, Take 25 mg by mouth 2 (Two) Times a Day., Disp: , Rfl:   •  icosapent ethyl (VASCEPA) 1 g capsule capsule, Take 2 g by mouth 2 (Two) Times a Day With Meals., Disp: , Rfl:   •  lisinopril (PRINIVIL,ZESTRIL) 40 MG tablet, Take 40 mg by mouth Daily. Take one tablet in the a.m., Disp: , Rfl:   •  metFORMIN (GLUCOPHAGE) 1000 MG tablet, Take 1,000 mg by mouth 2 (Two) Times a Day With Meals., Disp: , Rfl:   •  metoprolol succinate XL (TOPROL-XL) 25 MG 24 hr tablet, Take 25 mg by mouth Daily., Disp: , Rfl:   •  pravastatin (PRAVACHOL) 20 MG tablet, Take 1 tablet by mouth Daily., Disp: , Rfl:   •  terazosin (HYTRIN) 2 MG capsule, Take 1 capsule by mouth 2 (Two) Times a Day., Disp: , Rfl:   •  pravastatin (PRAVACHOL) 10 MG tablet, Take 20 mg by mouth Daily., Disp: , Rfl:       Objective:     Vitals:    10/21/22 1026   BP: 172/74   Pulse: 63   Weight: 88.4 kg (194  "lb 12.8 oz)   Height: 182.9 cm (72\")     Body mass index is 26.42 kg/m².    Constitutional:       Appearance: Well-developed.   Eyes:      General: No scleral icterus.  HENT:      Head: Normocephalic.   Neck:      Thyroid: No thyromegaly.      Vascular: No JVD.      Lymphadenopathy: No cervical adenopathy.   Pulmonary:      Effort: Pulmonary effort is normal.      Breath sounds: Normal breath sounds. No wheezing. No rales.   Cardiovascular:      Normal rate. Regular rhythm.      No gallop.   Edema:     Peripheral edema absent.   Abdominal:      Palpations: Abdomen is soft.      Tenderness: There is no abdominal tenderness.   Musculoskeletal: Normal range of motion. Skin:     General: Skin is warm and dry.      Findings: No rash.   Neurological:      Mental Status: Alert and oriented to person, place, and time.           ECG 12 Lead    Date/Time: 10/21/2022 11:23 AM  Performed by: Frank Jim MD  Authorized by: Frank Jim MD   Comparison: compared with previous ECG   Similar to previous ECG  Rhythm: sinus rhythm  Conduction: non-specific intraventricular conduction delay  QRS axis: left    Clinical impression: abnormal EKG             Assessment:       Diagnosis Plan   1. Coronary artery disease involving native coronary artery of native heart without angina pectoris        2. History of coronary artery bypass surgery               Plan:       I think he is doing well and will continue to do well risk modification is critical he has an apical LAD lesion not amenable to PCI so treatment of his diabetes is critical he is 77 he is on a moderate intensity statin that is probably okay as long as his lipids are all right his primary care physician is following that I am not can to make any changes I think he is doing quite well and I think that the future for him looks great from a cardiac standpoint I will have him come back and see Eliana in a year and then see me in 2    No follow-ups on file.     As always, " it has been a pleasure to participate in your patient's care.      Sincerely,       Frank Jim MD

## 2023-10-24 ENCOUNTER — OFFICE VISIT (OUTPATIENT)
Dept: CARDIOLOGY | Facility: CLINIC | Age: 78
End: 2023-10-24
Payer: COMMERCIAL

## 2023-10-24 VITALS
WEIGHT: 191 LBS | HEIGHT: 72 IN | OXYGEN SATURATION: 98 % | DIASTOLIC BLOOD PRESSURE: 70 MMHG | BODY MASS INDEX: 25.87 KG/M2 | SYSTOLIC BLOOD PRESSURE: 142 MMHG | HEART RATE: 78 BPM

## 2023-10-24 DIAGNOSIS — G47.33 OSA (OBSTRUCTIVE SLEEP APNEA): Chronic | ICD-10-CM

## 2023-10-24 DIAGNOSIS — E78.2 MIXED HYPERLIPIDEMIA: Chronic | ICD-10-CM

## 2023-10-24 DIAGNOSIS — I10 PRIMARY HYPERTENSION: Chronic | ICD-10-CM

## 2023-10-24 DIAGNOSIS — Z95.1 HISTORY OF CORONARY ARTERY BYPASS SURGERY: Chronic | ICD-10-CM

## 2023-10-24 DIAGNOSIS — I35.1 MODERATE AORTIC INSUFFICIENCY: Primary | Chronic | ICD-10-CM

## 2023-10-24 PROCEDURE — 99214 OFFICE O/P EST MOD 30 MIN: CPT | Performed by: NURSE PRACTITIONER

## 2023-10-24 PROCEDURE — 93000 ELECTROCARDIOGRAM COMPLETE: CPT | Performed by: NURSE PRACTITIONER

## 2023-10-24 RX ORDER — LISINOPRIL 20 MG/1
20 TABLET ORAL 2 TIMES DAILY
Qty: 180 TABLET | Refills: 3 | Status: SHIPPED | OUTPATIENT
Start: 2023-10-24 | End: 2023-10-24 | Stop reason: SDUPTHER

## 2023-10-24 RX ORDER — LISINOPRIL 20 MG/1
20 TABLET ORAL 2 TIMES DAILY
Qty: 180 TABLET | Refills: 3 | Status: SHIPPED | OUTPATIENT
Start: 2023-10-24

## 2023-10-24 NOTE — PROGRESS NOTES
Date of Office Visit: 10/24/2023  Encounter Provider: BEVERLY Hays  Place of Service: [facility] CARDIOLOGY  Patient Name: Carlton Núñez  :1945    Chief Complaint   Patient presents with    Follow-up    Coronary Artery Disease   :     HPI: Carlton Núñez is a 78 y.o. male who is a patient of Dr. Jim and is new to me today.  Previously was seen by another cardiologist that is no longer at [facility].  He had a two-vessel bypass at [facility] by Dr. Robert Coe in the past.  This was done in the .  He had a cardiac cath in  both KARIE and LIMA were used and both were patent.  Circumflex was free of disease normal EF.  He did have some moderate aortic insufficiency.  He is a retired  works on a farm.  He was last in the office a year ago and not having any angina or shortness of breath.  He has hypertension and dyslipidemia.    Overall he is doing well.  Denies any chest pain, pressure or tightness.  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  He tolerates activities well.  He has no symptoms of claudication.  He does have some PVCs on his EKG today but he is not having any palpitations.  He does not check his blood pressure at home.  Last cholesterol showed a total cholesterol of 156, HDL 44, triglyceride of 186 and LDL of 84.  He is on pravastatin.  Previous testing and notes have been reviewed by me.   Past Medical History:   Diagnosis Date    Allergy     Atrial fibrillation     Colon cancer     Coronary artery disease     Heart attack     High cholesterol     History of being hospitalized     Heart, colon cancer, hernia    History of cardiac cath     2016 - Two vessel CAD with patent LIMA/KARIE graftsto LAD and RCA. Culprit vessel likely diagonal with 95% ostial lesion, too small for intervension, best treated medically. LCX 60% proximal but no flow limiting. EF 55-60%.    History of echocardiogram      5/18/17 - Mild concentric LVH. EF 55-60%. Mildly dilated LA. Moderate AR. Mild to moderate MR. Mild TR ad MA    History of nuclear stress test     11/10/16 - Mild inferior wall hypokinesis. Inferoapical wall hypokinesis. The overall EF is 56%. Study suggestive of prior inferior wall infarction with a mild to moderate area of ischemia remaining.    Hyperlipidemia     Hypertension     Irregular heart beat     Kidney stones     Myocardial infarction     LUCY (obstructive sleep apnea)        Past Surgical History:   Procedure Laterality Date    CARDIAC CATHETERIZATION N/A 6/23/2020    Procedure: Left Heart Cath;  Surgeon: Alton Coronado MD;  [facility];  Service: Cardiovascular;  Laterality: N/A;    CARDIAC CATHETERIZATION N/A 6/23/2020    Procedure: Coronary angiography;  Surgeon: Alton Coronado MD;  Location:  [facility] CATH INVASIVE LOCATION;  Service: Cardiovascular;  Laterality: N/A;    CARDIAC CATHETERIZATION N/A 6/23/2020    Procedure: Left ventriculography;  Surgeon: Alton Coronado MD;  Location:  [facility] CATH INVASIVE LOCATION;  Service: Cardiovascular;  Laterality: N/A;    CARDIAC CATHETERIZATION N/A 6/23/2020    Procedure: Native mammary injection;  Surgeon: Alton Coronado MD;  Location:  [facility] CATH INVASIVE LOCATION;  Service: Cardiovascular;  Laterality: N/A;    CATARACT EXTRACTION WITH INTRAOCULAR LENS IMPLANT Bilateral     COLON RESECTION      CORONARY ARTERY BYPASS GRAFT  12/01/1996    LIMA to LAD and KARIE to RCA, performed by Dr Barney.    INGUINAL HERNIA REPAIR Bilateral     SINUS SURGERY      TONSILLECTOMY         Social History     Socioeconomic History    Marital status:    Tobacco Use    Smoking status: Never    Smokeless tobacco: Never   Substance and Sexual Activity    Alcohol use: Yes    Drug use: Never    Sexual activity: Defer       Family History   Problem Relation Age of Onset    Cancer Other     Heart attack Other     Hypertension Other        Review of  Systems   Constitutional: Negative for diaphoresis and malaise/fatigue.   Cardiovascular:  Negative for chest pain, claudication, dyspnea on exertion, irregular heartbeat, leg swelling, near-syncope, orthopnea, palpitations, paroxysmal nocturnal dyspnea and syncope.   Respiratory:  Negative for cough, shortness of breath and sleep disturbances due to breathing.    Musculoskeletal:  Negative for falls.   Neurological:  Negative for dizziness and weakness.   Psychiatric/Behavioral:  Negative for altered mental status and substance abuse.        Allergies   Allergen Reactions    Contrast Dye (Echo Or Unknown Ct/Mr) Hives    Phenergan [Promethazine Hcl] Nausea And Vomiting     .    Hydralazine Dizziness    Iodinated Contrast Media Hives    Promethazine GI Intolerance    Clonidine Rash         Current Outpatient Medications:     amLODIPine (NORVASC) 5 MG tablet, Take 1 tablet by mouth Daily., Disp: , Rfl:     aspirin 81 MG tablet, Take 1 tablet by mouth Daily., Disp: , Rfl:     coenzyme Q10 100 MG capsule, Take 2 capsules by mouth Daily., Disp: , Rfl:     empagliflozin (JARDIANCE) 10 MG tablet tablet, Take  by mouth. Take one tablet daily, Disp: , Rfl:     hydrochlorothiazide (HYDRODIURIL) 25 MG tablet, Take 1 tablet by mouth 2 (Two) Times a Day., Disp: , Rfl:     icosapent ethyl (VASCEPA) 1 g capsule capsule, Take 2 g by mouth 2 (Two) Times a Day With Meals., Disp: , Rfl:     metFORMIN (GLUCOPHAGE) 1000 MG tablet, Take 1 tablet by mouth 2 (Two) Times a Day With Meals., Disp: , Rfl:     metoprolol succinate XL (TOPROL-XL) 25 MG 24 hr tablet, Take 1 tablet by mouth Daily., Disp: , Rfl:     pravastatin (PRAVACHOL) 20 MG tablet, Take 1 tablet by mouth Daily., Disp: , Rfl:     terazosin (HYTRIN) 2 MG capsule, Take 1 capsule by mouth 2 (Two) Times a Day., Disp: , Rfl:     lisinopril (PRINIVIL,ZESTRIL) 20 MG tablet, Take 1 tablet by mouth 2 (Two) Times a Day., Disp: 180 tablet, Rfl: 3      Objective:     Vitals:    10/24/23  "1031   BP: 142/70   Pulse: 78   SpO2: 98%   Weight: 86.6 kg (191 lb)   Height: 182.9 cm (72\")     Body mass index is 25.9 kg/m².    PHYSICAL EXAM:    Constitutional:       General: Not in acute distress.     Appearance: Normal appearance. Well-developed.   Eyes:      Pupils: Pupils are equal, round, and reactive to light.   HENT:      Head: Normocephalic.   Neck:      Vascular: No carotid bruit or JVD.   Pulmonary:      Effort: Pulmonary effort is normal. No tachypnea.      Breath sounds: Normal breath sounds. No wheezing. No rales.   Cardiovascular:      Normal rate. Regular rhythm.      No gallop.    Pulses:     Intact distal pulses.   Edema:     Peripheral edema absent.   Abdominal:      General: Bowel sounds are normal.      Palpations: Abdomen is soft.      Tenderness: There is no abdominal tenderness.   Musculoskeletal: Normal range of motion.      Cervical back: Normal range of motion and neck supple. No edema. Skin:     General: Skin is warm and dry.   Neurological:      Mental Status: Alert and oriented to person, place, and time.           ECG 12 Lead    Date/Time: 10/24/2023 11:02 AM  Performed by: Shyanne Morgan APRN    Authorized by: Shyanne Morgan APRN  Comparison: compared with previous ECG from 10/21/2022  Similar to previous ECG  Rhythm: sinus rhythm  Rate: normal  Conduction: non-specific intraventricular conduction delay  QRS axis: normal  Other findings: poor R wave progression    Clinical impression: abnormal EKG            Assessment:       Diagnosis Plan   1. Moderate aortic insufficiency     Stable on last echo, asymptomatic   2. Primary hypertension     I want him to split his lisinopril in half and take 20 mg twice a day.  Asked him to check his blood pressure regularly at home.  He has an appointment at the end of the year with his primary care who can further manage his blood pressure.   3. History of coronary artery bypass surgery     No angina symptoms on aspirin, statin and " beta-blocker   4. Mixed hyperlipidemia     LDL mildly elevated at 84.  Discussed with parent him goal is less than 70   5. LUCY (obstructive sleep apnea)     Compliant with CPAP     No orders of the defined types were placed in this encounter.         Plan:   Follow-up in 1 years time         Your medication list            Accurate as of October 24, 2023 11:02 AM. If you have any questions, ask your nurse or doctor.                CHANGE how you take these medications        Instructions Last Dose Given Next Dose Due   lisinopril 20 MG tablet  Commonly known as: IVY CORREIASTRIL  What changed:   medication strength  how much to take  when to take this  additional instructions  Changed by: BEVERLY Hays      Take 1 tablet by mouth 2 (Two) Times a Day.              CONTINUE taking these medications        Instructions Last Dose Given Next Dose Due   amLODIPine 5 MG tablet  Commonly known as: NORVASC      Take 1 tablet by mouth Daily.       aspirin 81 MG tablet      Take 1 tablet by mouth Daily.       coenzyme Q10 100 MG capsule      Take 2 capsules by mouth Daily.       empagliflozin 10 MG tablet tablet  Commonly known as: JARDIANCE      Take  by mouth. Take one tablet daily       hydroCHLOROthiazide 25 MG tablet  Commonly known as: HYDRODIURIL      Take 1 tablet by mouth 2 (Two) Times a Day.       icosapent ethyl 1 g capsule capsule  Commonly known as: VASCEPA      Take 2 g by mouth 2 (Two) Times a Day With Meals.       metFORMIN 1000 MG tablet  Commonly known as: GLUCOPHAGE      Take 1 tablet by mouth 2 (Two) Times a Day With Meals.       metoprolol succinate XL 25 MG 24 hr tablet  Commonly known as: TOPROL-XL      Take 1 tablet by mouth Daily.       pravastatin 20 MG tablet  Commonly known as: PRAVACHOL      Take 1 tablet by mouth Daily.       terazosin 2 MG capsule  Commonly known as: HYTRIN      Take 1 capsule by mouth 2 (Two) Times a Day.                 Where to Get Your Medications        These  medications were sent to [facility] #[id number] - [place], [place] - [address] - [phone]  - [phone]  [facility] [address]      Phone: [phone]   lisinopril 20 MG tablet           As always, it has been a pleasure to participate in your patient's care.      Sincerely,     Shyanne PAUL

## 2024-05-09 ENCOUNTER — CLINICAL SUPPORT (OUTPATIENT)
Dept: GENETICS | Facility: HOSPITAL | Age: 79
End: 2024-05-09
Payer: COMMERCIAL

## 2024-05-09 DIAGNOSIS — C18.9 MALIGNANT NEOPLASM OF COLON, UNSPECIFIED PART OF COLON: ICD-10-CM

## 2024-05-09 DIAGNOSIS — Z80.0 FAMILY HISTORY OF PANCREATIC CANCER: ICD-10-CM

## 2024-05-09 DIAGNOSIS — Z84.89 FAMILY HISTORY OF GENETIC DISORDER: ICD-10-CM

## 2024-05-09 DIAGNOSIS — Z13.79 GENETIC TESTING: Primary | ICD-10-CM

## 2024-05-09 DIAGNOSIS — Z80.3 FAMILY HISTORY OF BREAST CANCER: ICD-10-CM

## 2024-05-09 PROCEDURE — 96040: CPT | Performed by: GENETIC COUNSELOR, MS

## 2024-05-22 ENCOUNTER — DOCUMENTATION (OUTPATIENT)
Dept: GENETICS | Facility: HOSPITAL | Age: 79
End: 2024-05-22
Payer: COMMERCIAL

## 2024-05-22 ENCOUNTER — TELEPHONE (OUTPATIENT)
Dept: GENETICS | Facility: HOSPITAL | Age: 79
End: 2024-05-22
Payer: COMMERCIAL

## 2024-05-22 NOTE — TELEPHONE ENCOUNTER
Attempted to contact Mr. Núñez regarding genetic test results. He did not answer. Left  asking for him to return my call.

## 2024-05-22 NOTE — PROGRESS NOTES
Carlton Núñez is an 78-year-old male who was referred for genetic counseling due to a family history of cancer and the identification of a PALB2 mutation in his daughter. Mr. Núñez was diagnosed with colon cancer in 2012. He had a colon resection and chemotherapy. His most recent colonoscopy was in 2022, and he has these done every 3 years. Mr. Núñez was interested in discussing his risk for a PABL2 mutation and opted to pursue single site testing for the mutation identified in his daughter. 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 Mr. Núñez was interested in discussing the information related to this finding. These results were discussed with Mr. Núñez by telephone on 5/22/24.      PERTINENT FAMILY HISTORY: (See attached pedigree)   Daughter:  PALB2+ ([id number])  Mother:  Pancreatic cancer     Breast cancer    Records regarding Mr. Núñez's daughter's genetic test results were available for review and a copy was provided to the testing lab. Mr. Núñez's daughter had testing via the [employer] panel.     GENETIC COUNSELING (40 minutes): We reviewed the family history information in detail. Cases of cancer follow three general patterns: sporadic, familial, and hereditary. While most cancer is sporadic, some cases appear to occur in family clusters. These cases are said to be familial and account for 10-20% of breast cancer cases. Familial cases may be due to a combination of shared genes and environmental factors among family members. In even fewer families, the cancer is said to be inherited, and the genes responsible for the cancer are known.      Family histories typical of hereditary cancer syndromes usually include multiple first- and second-degree relatives diagnosed with cancer types that define a syndrome. These cases tend to be diagnosed at younger-than-expected ages and can be bilateral or multifocal. The cancer in these families follows an  autosomal dominant inheritance pattern, which indicates the likely presence of a mutation in a cancer susceptibility gene. Children and siblings of an individual believed to carry this mutation have a 50% chance of inheriting that mutation, thereby inheriting the increased risk to develop cancer. These mutations can be passed down from the maternal or the paternal lineage.     Given the family history of a PALB2 mutation, we discussed this in more detail. Mutations in PALB2 have been found to increase the risk of female breast cancer resulting in a lifetime risk of approximately 41-60%, a 3-5% ovarian cancer risk, and a 2-5% risk for pancreatic cancer (NCCN 3.2024). There is also a slight risk for male breast cancer, 0.9% risk by age 70.    We discussed the availability of multigene panels that allow for testing of a number of cancer susceptibility genes simultaneously. The additional genes included have varying degrees of risk associated, and some have also been associated with increased risks for other cancers.     GENETIC TESTING:  The risks, benefits and limitations of genetic testing and implications for clinical management following testing were reviewed. DNA test results can influence decisions regarding screening, prevention and surgical management. Genetic testing can have significant psychological implications for both individuals and families. Also discussed was the possibility of employment and insurance discrimination based on genetic test results and the laws in place to prevent this. We discussed panel testing, which would involve testing several hereditary cancer predisposition genes at the same time. The benefits and limitations of genetic testing were discussed, as well as the implications of a positive or negative test result. Mr. Núñez elected to proceed with single site analysis for the familial PALB2 mutation.      TEST RESULTS:  Testing was positive for the familial pathogenic mutation in  the PALB2 gene ([id number]). Genetic testing for the PALB2 mutation should be offered to other family members to determine whether they are at an increased risk for PALB2 associated cancers. Mr. Núñez's children each have a 50% chance of having inherited the PALB2 mutation. One daughter has not yet had testing done. Relatives would need a copy of Mr. Núñez's result to ensure they were being tested for the correct mutation. It is uncertain at this time if the PALB2 mutation was inherited from his mother or father, therefore, relatives on both sides of the family could consider genetic testing. If a maternal or paternal relative were to test positive, then that would confirm which side of the family is at risk.   We would be happy to see family members who live in the area in our clinic to discuss this information and testing options. Relatives can request a referral to [facility], and our coordinator will call to schedule an appointment once the referral is received. They can call [phone] if they need help with making this referral. Testing is available to individuals who are at least 18 years of age. For family members who live elsewhere, there are genetic counselors at most [facility]. They can find a genetic counselor by visiting the National Society of Genetic Counselors website at www.nsgc.org.      CANCER RISK:  Mutations in PALB2 have been found to increase the risk of female breast cancer resulting in a lifetime risk of approximately 41-60%. NCCN guidelines quote the ovarian cancer risk associated with PALB2 to be 3-5%, compared to the general population risk of 1-2%. The risk for male breast cancer is 0.9% by age 70, and the lifetime risk for pancreatic cancer is estimated to be 2-5% (3.2024 NCCN).     CANCER SCREENING: Per NCCN guidelines, men should undergo self-breast exam and annual clinical breast exam starting at age 35. With Mr. Núñez being over age 70,  we discussed that he probably no longer needs annual screening for this. I did encourage him to get any suspicious lumps checked out immediately. Although no standardized screening tests have been proven to be effective in early pancreatic cancer detection, there are some options considered for individuals with a family history of pancreatic cancer and an identified PALB2 mutation. Some academic centers are offering screening with endoscopic ultrasound, high-resolution pancreatic protocol CT, or MRI, starting at age 50 or 10 years younger than the earliest diagnosis of pancreatic cancer in the family. Since these screening methods are not standard of care by NCCN guidelines, consideration of referral to a clinical research screening program is appropriate. The [facility] has a pancreatic cancer screening program. Of note, Mr. Núñez has a known family history of pancreatic cancer. He will consider having this screening done but does not wish to be referred for it at this time.     PLAN: Genetic counseling remains available to Mr. Núñez and his family. He is welcome to contact us in the future with any questions or concerns at [phone].       Meliza Mistry MS, [facility], [facility]  Licensed Certified Genetic Counselor       Cc: Carlton Chan MD

## 2025-02-04 ENCOUNTER — APPOINTMENT (OUTPATIENT)
Dept: GENERAL RADIOLOGY | Facility: HOSPITAL | Age: 80
End: 2025-02-04
Payer: COMMERCIAL

## 2025-02-04 ENCOUNTER — HOSPITAL ENCOUNTER (OUTPATIENT)
Facility: HOSPITAL | Age: 80
Setting detail: OBSERVATION
Discharge: HOME | End: 2025-02-06
Attending: EMERGENCY MEDICINE | Admitting: STUDENT IN AN ORGANIZED HEALTH CARE EDUCATION/TRAINING PROGRAM
Payer: COMMERCIAL

## 2025-02-04 DIAGNOSIS — I48.91 ATRIAL FIBRILLATION, UNSPECIFIED TYPE: ICD-10-CM

## 2025-02-04 DIAGNOSIS — R29.6 FREQUENT FALLS: ICD-10-CM

## 2025-02-04 DIAGNOSIS — R53.1 GENERALIZED WEAKNESS: ICD-10-CM

## 2025-02-04 DIAGNOSIS — N28.9 ACUTE RENAL INSUFFICIENCY: ICD-10-CM

## 2025-02-04 DIAGNOSIS — J10.1 INFLUENZA A: Primary | ICD-10-CM

## 2025-02-04 DIAGNOSIS — R79.89 ELEVATED TROPONIN: ICD-10-CM

## 2025-02-04 DIAGNOSIS — Z78.9 DECREASED ACTIVITIES OF DAILY LIVING (ADL): ICD-10-CM

## 2025-02-04 PROBLEM — E11.65 TYPE 2 DIABETES MELLITUS WITH HYPERGLYCEMIA, WITHOUT LONG-TERM CURRENT USE OF INSULIN: Status: ACTIVE | Noted: 2025-02-04

## 2025-02-04 PROBLEM — N18.31 STAGE 3A CHRONIC KIDNEY DISEASE: Status: ACTIVE | Noted: 2025-02-04

## 2025-02-04 PROBLEM — Z85.038 HISTORY OF COLON CANCER: Status: ACTIVE | Noted: 2025-02-04

## 2025-02-04 PROBLEM — N17.9 AKI (ACUTE KIDNEY INJURY): Status: ACTIVE | Noted: 2025-02-04

## 2025-02-04 PROBLEM — E86.0 DEHYDRATION: Status: ACTIVE | Noted: 2025-02-04

## 2025-02-04 LAB
ALBUMIN SERPL-MCNC: 3.4 G/DL (ref 3.5–5.2)
ALBUMIN/GLOB SERPL: 1.2 G/DL
ALP SERPL-CCNC: 69 U/L (ref 39–117)
ALT SERPL W P-5'-P-CCNC: 21 U/L (ref 1–41)
ANION GAP SERPL CALCULATED.3IONS-SCNC: 11.8 MMOL/L (ref 5–15)
AST SERPL-CCNC: 29 U/L (ref 1–40)
B PARAPERT DNA SPEC QL NAA+PROBE: NOT DETECTED
B PERT DNA SPEC QL NAA+PROBE: NOT DETECTED
BASOPHILS # BLD AUTO: 0.01 10*3/MM3 (ref 0–0.2)
BASOPHILS NFR BLD AUTO: 0.1 % (ref 0–1.5)
BILIRUB SERPL-MCNC: 0.7 MG/DL (ref 0–1.2)
BUN SERPL-MCNC: 27 MG/DL (ref 8–23)
BUN/CREAT SERPL: 15.8 (ref 7–25)
C PNEUM DNA NPH QL NAA+NON-PROBE: NOT DETECTED
CALCIUM SPEC-SCNC: 9.2 MG/DL (ref 8.6–10.5)
CHLORIDE SERPL-SCNC: 101 MMOL/L (ref 98–107)
CO2 SERPL-SCNC: 24.2 MMOL/L (ref 22–29)
CREAT SERPL-MCNC: 1.71 MG/DL (ref 0.76–1.27)
DEPRECATED RDW RBC AUTO: 45.9 FL (ref 37–54)
EGFRCR SERPLBLD CKD-EPI 2021: 39.7 ML/MIN/1.73
EOSINOPHIL # BLD AUTO: 0.02 10*3/MM3 (ref 0–0.4)
EOSINOPHIL NFR BLD AUTO: 0.3 % (ref 0.3–6.2)
ERYTHROCYTE [DISTWIDTH] IN BLOOD BY AUTOMATED COUNT: 14.4 % (ref 12.3–15.4)
FLUAV H1 2009 PAND RNA NPH QL NAA+PROBE: DETECTED
FLUBV RNA ISLT QL NAA+PROBE: NOT DETECTED
GEN 5 1HR TROPONIN T REFLEX: 88 NG/L
GLOBULIN UR ELPH-MCNC: 2.8 GM/DL
GLUCOSE BLDC GLUCOMTR-MCNC: 110 MG/DL (ref 70–130)
GLUCOSE SERPL-MCNC: 151 MG/DL (ref 65–99)
HADV DNA SPEC NAA+PROBE: NOT DETECTED
HBA1C MFR BLD: 6.2 % (ref 4.8–5.6)
HCOV 229E RNA SPEC QL NAA+PROBE: NOT DETECTED
HCOV HKU1 RNA SPEC QL NAA+PROBE: NOT DETECTED
HCOV NL63 RNA SPEC QL NAA+PROBE: NOT DETECTED
HCOV OC43 RNA SPEC QL NAA+PROBE: NOT DETECTED
HCT VFR BLD AUTO: 39.1 % (ref 37.5–51)
HGB BLD-MCNC: 13 G/DL (ref 13–17.7)
HMPV RNA NPH QL NAA+NON-PROBE: NOT DETECTED
HPIV1 RNA ISLT QL NAA+PROBE: NOT DETECTED
HPIV2 RNA SPEC QL NAA+PROBE: NOT DETECTED
HPIV3 RNA NPH QL NAA+PROBE: NOT DETECTED
HPIV4 P GENE NPH QL NAA+PROBE: NOT DETECTED
IMM GRANULOCYTES # BLD AUTO: 0.03 10*3/MM3 (ref 0–0.05)
IMM GRANULOCYTES NFR BLD AUTO: 0.4 % (ref 0–0.5)
LYMPHOCYTES # BLD AUTO: 0.24 10*3/MM3 (ref 0.7–3.1)
LYMPHOCYTES NFR BLD AUTO: 3.3 % (ref 19.6–45.3)
M PNEUMO IGG SER IA-ACNC: NOT DETECTED
MCH RBC QN AUTO: 29.6 PG (ref 26.6–33)
MCHC RBC AUTO-ENTMCNC: 33.2 G/DL (ref 31.5–35.7)
MCV RBC AUTO: 89.1 FL (ref 79–97)
MONOCYTES # BLD AUTO: 0.37 10*3/MM3 (ref 0.1–0.9)
MONOCYTES NFR BLD AUTO: 5 % (ref 5–12)
NEUTROPHILS NFR BLD AUTO: 6.71 10*3/MM3 (ref 1.7–7)
NEUTROPHILS NFR BLD AUTO: 90.9 % (ref 42.7–76)
NRBC BLD AUTO-RTO: 0 /100 WBC (ref 0–0.2)
NT-PROBNP SERPL-MCNC: 1609 PG/ML (ref 0–1800)
PLATELET # BLD AUTO: 132 10*3/MM3 (ref 140–450)
PMV BLD AUTO: 8.2 FL (ref 6–12)
POTASSIUM SERPL-SCNC: 3.9 MMOL/L (ref 3.5–5.2)
PROT SERPL-MCNC: 6.2 G/DL (ref 6–8.5)
QT INTERVAL: 374 MS
QTC INTERVAL: 432 MS
RBC # BLD AUTO: 4.39 10*6/MM3 (ref 4.14–5.8)
RHINOVIRUS RNA SPEC NAA+PROBE: NOT DETECTED
RSV RNA NPH QL NAA+NON-PROBE: NOT DETECTED
SARS-COV-2 RNA NPH QL NAA+NON-PROBE: NOT DETECTED
SODIUM SERPL-SCNC: 137 MMOL/L (ref 136–145)
TROPONIN T % DELTA: 11
TROPONIN T NUMERIC DELTA: 9 NG/L
TROPONIN T SERPL HS-MCNC: 79 NG/L
WBC NRBC COR # BLD AUTO: 7.38 10*3/MM3 (ref 3.4–10.8)

## 2025-02-04 PROCEDURE — 83880 ASSAY OF NATRIURETIC PEPTIDE: CPT | Performed by: EMERGENCY MEDICINE

## 2025-02-04 PROCEDURE — G0378 HOSPITAL OBSERVATION PER HR: HCPCS

## 2025-02-04 PROCEDURE — 84484 ASSAY OF TROPONIN QUANT: CPT | Performed by: EMERGENCY MEDICINE

## 2025-02-04 PROCEDURE — 99285 EMERGENCY DEPT VISIT HI MDM: CPT

## 2025-02-04 PROCEDURE — 85025 COMPLETE CBC W/AUTO DIFF WBC: CPT | Performed by: PHYSICIAN ASSISTANT

## 2025-02-04 PROCEDURE — 93010 ELECTROCARDIOGRAM REPORT: CPT | Performed by: INTERNAL MEDICINE

## 2025-02-04 PROCEDURE — 25810000003 SODIUM CHLORIDE 0.9 % SOLUTION: Performed by: INTERNAL MEDICINE

## 2025-02-04 PROCEDURE — 96360 HYDRATION IV INFUSION INIT: CPT

## 2025-02-04 PROCEDURE — 80053 COMPREHEN METABOLIC PANEL: CPT | Performed by: PHYSICIAN ASSISTANT

## 2025-02-04 PROCEDURE — 82948 REAGENT STRIP/BLOOD GLUCOSE: CPT

## 2025-02-04 PROCEDURE — 96372 THER/PROPH/DIAG INJ SC/IM: CPT

## 2025-02-04 PROCEDURE — 94799 UNLISTED PULMONARY SVC/PX: CPT

## 2025-02-04 PROCEDURE — 71045 X-RAY EXAM CHEST 1 VIEW: CPT

## 2025-02-04 PROCEDURE — 36415 COLL VENOUS BLD VENIPUNCTURE: CPT

## 2025-02-04 PROCEDURE — 25010000002 ENOXAPARIN PER 10 MG: Performed by: INTERNAL MEDICINE

## 2025-02-04 PROCEDURE — 93005 ELECTROCARDIOGRAM TRACING: CPT | Performed by: EMERGENCY MEDICINE

## 2025-02-04 PROCEDURE — 83036 HEMOGLOBIN GLYCOSYLATED A1C: CPT | Performed by: INTERNAL MEDICINE

## 2025-02-04 PROCEDURE — 0202U NFCT DS 22 TRGT SARS-COV-2: CPT | Performed by: PHYSICIAN ASSISTANT

## 2025-02-04 PROCEDURE — 25810000003 SODIUM CHLORIDE 0.9 % SOLUTION: Performed by: PHYSICIAN ASSISTANT

## 2025-02-04 RX ORDER — ACETAMINOPHEN 325 MG/1
650 TABLET ORAL EVERY 4 HOURS PRN
Status: DISCONTINUED | OUTPATIENT
Start: 2025-02-04 | End: 2025-02-06 | Stop reason: HOSPADM

## 2025-02-04 RX ORDER — INSULIN LISPRO 100 [IU]/ML
2-9 INJECTION, SOLUTION INTRAVENOUS; SUBCUTANEOUS
Status: DISCONTINUED | OUTPATIENT
Start: 2025-02-04 | End: 2025-02-06 | Stop reason: HOSPADM

## 2025-02-04 RX ORDER — FAMOTIDINE 20 MG/1
10 TABLET, FILM COATED ORAL 2 TIMES DAILY PRN
Status: DISCONTINUED | OUTPATIENT
Start: 2025-02-04 | End: 2025-02-06 | Stop reason: HOSPADM

## 2025-02-04 RX ORDER — PRAVASTATIN SODIUM 20 MG
20 TABLET ORAL DAILY
Status: DISCONTINUED | OUTPATIENT
Start: 2025-02-05 | End: 2025-02-05

## 2025-02-04 RX ORDER — OSELTAMIVIR PHOSPHATE 75 MG/1
75 CAPSULE ORAL ONCE
Status: COMPLETED | OUTPATIENT
Start: 2025-02-04 | End: 2025-02-04

## 2025-02-04 RX ORDER — METOPROLOL SUCCINATE 25 MG/1
25 TABLET, EXTENDED RELEASE ORAL DAILY
Status: DISCONTINUED | OUTPATIENT
Start: 2025-02-05 | End: 2025-02-06 | Stop reason: HOSPADM

## 2025-02-04 RX ORDER — ENOXAPARIN SODIUM 100 MG/ML
80 INJECTION SUBCUTANEOUS NIGHTLY
Status: DISCONTINUED | OUTPATIENT
Start: 2025-02-05 | End: 2025-02-04

## 2025-02-04 RX ORDER — BISACODYL 5 MG/1
5 TABLET, DELAYED RELEASE ORAL DAILY PRN
Status: DISCONTINUED | OUTPATIENT
Start: 2025-02-04 | End: 2025-02-06 | Stop reason: HOSPADM

## 2025-02-04 RX ORDER — NICOTINE POLACRILEX 4 MG
15 LOZENGE BUCCAL
Status: DISCONTINUED | OUTPATIENT
Start: 2025-02-04 | End: 2025-02-06 | Stop reason: HOSPADM

## 2025-02-04 RX ORDER — LABETALOL 100 MG/1
50 TABLET, FILM COATED ORAL EVERY 6 HOURS PRN
Status: DISCONTINUED | OUTPATIENT
Start: 2025-02-04 | End: 2025-02-06 | Stop reason: HOSPADM

## 2025-02-04 RX ORDER — OSELTAMIVIR PHOSPHATE 30 MG/1
30 CAPSULE ORAL EVERY 12 HOURS SCHEDULED
Status: DISCONTINUED | OUTPATIENT
Start: 2025-02-05 | End: 2025-02-06 | Stop reason: HOSPADM

## 2025-02-04 RX ORDER — ONDANSETRON 2 MG/ML
4 INJECTION INTRAMUSCULAR; INTRAVENOUS EVERY 6 HOURS PRN
Status: DISCONTINUED | OUTPATIENT
Start: 2025-02-04 | End: 2025-02-06 | Stop reason: HOSPADM

## 2025-02-04 RX ORDER — IBUPROFEN 600 MG/1
1 TABLET ORAL
Status: DISCONTINUED | OUTPATIENT
Start: 2025-02-04 | End: 2025-02-06 | Stop reason: HOSPADM

## 2025-02-04 RX ORDER — BISACODYL 10 MG
10 SUPPOSITORY, RECTAL RECTAL DAILY PRN
Status: DISCONTINUED | OUTPATIENT
Start: 2025-02-04 | End: 2025-02-06 | Stop reason: HOSPADM

## 2025-02-04 RX ORDER — POLYETHYLENE GLYCOL 3350 17 G/17G
17 POWDER, FOR SOLUTION ORAL DAILY PRN
Status: DISCONTINUED | OUTPATIENT
Start: 2025-02-04 | End: 2025-02-06 | Stop reason: HOSPADM

## 2025-02-04 RX ORDER — ACETAMINOPHEN 650 MG/1
650 SUPPOSITORY RECTAL EVERY 4 HOURS PRN
Status: DISCONTINUED | OUTPATIENT
Start: 2025-02-04 | End: 2025-02-06 | Stop reason: HOSPADM

## 2025-02-04 RX ORDER — ACETAMINOPHEN 160 MG/5ML
650 SOLUTION ORAL EVERY 4 HOURS PRN
Status: DISCONTINUED | OUTPATIENT
Start: 2025-02-04 | End: 2025-02-06 | Stop reason: HOSPADM

## 2025-02-04 RX ORDER — SODIUM CHLORIDE 0.9 % (FLUSH) 0.9 %
3 SYRINGE (ML) INJECTION EVERY 12 HOURS SCHEDULED
Status: DISCONTINUED | OUTPATIENT
Start: 2025-02-04 | End: 2025-02-06 | Stop reason: HOSPADM

## 2025-02-04 RX ORDER — SODIUM CHLORIDE 9 MG/ML
40 INJECTION, SOLUTION INTRAVENOUS AS NEEDED
Status: DISCONTINUED | OUTPATIENT
Start: 2025-02-04 | End: 2025-02-06 | Stop reason: HOSPADM

## 2025-02-04 RX ORDER — ONDANSETRON 4 MG/1
4 TABLET, ORALLY DISINTEGRATING ORAL EVERY 6 HOURS PRN
Status: DISCONTINUED | OUTPATIENT
Start: 2025-02-04 | End: 2025-02-06 | Stop reason: HOSPADM

## 2025-02-04 RX ORDER — ASPIRIN 81 MG/1
81 TABLET ORAL NIGHTLY
Status: DISCONTINUED | OUTPATIENT
Start: 2025-02-04 | End: 2025-02-05

## 2025-02-04 RX ORDER — AMOXICILLIN 250 MG
2 CAPSULE ORAL 2 TIMES DAILY PRN
Status: DISCONTINUED | OUTPATIENT
Start: 2025-02-04 | End: 2025-02-06 | Stop reason: HOSPADM

## 2025-02-04 RX ORDER — DEXTROSE MONOHYDRATE 25 G/50ML
25 INJECTION, SOLUTION INTRAVENOUS
Status: DISCONTINUED | OUTPATIENT
Start: 2025-02-04 | End: 2025-02-06 | Stop reason: HOSPADM

## 2025-02-04 RX ORDER — SODIUM CHLORIDE 9 MG/ML
75 INJECTION, SOLUTION INTRAVENOUS CONTINUOUS
Status: ACTIVE | OUTPATIENT
Start: 2025-02-04 | End: 2025-02-05

## 2025-02-04 RX ORDER — SODIUM CHLORIDE 0.9 % (FLUSH) 0.9 %
3-10 SYRINGE (ML) INJECTION AS NEEDED
Status: DISCONTINUED | OUTPATIENT
Start: 2025-02-04 | End: 2025-02-06 | Stop reason: HOSPADM

## 2025-02-04 RX ORDER — ENOXAPARIN SODIUM 100 MG/ML
40 INJECTION SUBCUTANEOUS NIGHTLY
Status: DISCONTINUED | OUTPATIENT
Start: 2025-02-04 | End: 2025-02-04

## 2025-02-04 RX ORDER — ACETAMINOPHEN 325 MG/1
650 TABLET ORAL 2 TIMES DAILY
Status: DISCONTINUED | OUTPATIENT
Start: 2025-02-04 | End: 2025-02-06 | Stop reason: HOSPADM

## 2025-02-04 RX ADMIN — ACETAMINOPHEN 650 MG: 325 TABLET ORAL at 20:55

## 2025-02-04 RX ADMIN — ASPIRIN 81 MG: 81 TABLET, COATED ORAL at 23:04

## 2025-02-04 RX ADMIN — Medication 3 ML: at 21:38

## 2025-02-04 RX ADMIN — ENOXAPARIN SODIUM 40 MG: 100 INJECTION SUBCUTANEOUS at 23:04

## 2025-02-04 RX ADMIN — SODIUM CHLORIDE 1000 ML: 9 INJECTION, SOLUTION INTRAVENOUS at 18:12

## 2025-02-04 RX ADMIN — SODIUM CHLORIDE 75 ML/HR: 9 INJECTION, SOLUTION INTRAVENOUS at 22:40

## 2025-02-04 RX ADMIN — OSELTAMIVIR PHOSPHATE 75 MG: 75 CAPSULE ORAL at 20:55

## 2025-02-04 RX ADMIN — SODIUM CHLORIDE 75 ML/HR: 9 INJECTION, SOLUTION INTRAVENOUS at 21:37

## 2025-02-05 ENCOUNTER — APPOINTMENT (OUTPATIENT)
Dept: CARDIOLOGY | Facility: HOSPITAL | Age: 80
End: 2025-02-05
Payer: COMMERCIAL

## 2025-02-05 LAB
ANION GAP SERPL CALCULATED.3IONS-SCNC: 11.9 MMOL/L (ref 5–15)
AORTIC DIMENSIONLESS INDEX: 0.9 (DI)
AV MEAN PRESS GRAD SYS DOP V1V2: 2.3 MMHG
AV VMAX SYS DOP: 103.5 CM/SEC
BH CV ECHO MEAS - AI P1/2T: 477.1 MSEC
BH CV ECHO MEAS - AO MAX PG: 4.3 MMHG
BH CV ECHO MEAS - AO ROOT DIAM: 4 CM
BH CV ECHO MEAS - AO V2 VTI: 22.1 CM
BH CV ECHO MEAS - AVA(I,D): 4 CM2
BH CV ECHO MEAS - EDV(CUBED): 108.1 ML
BH CV ECHO MEAS - EDV(MOD-SP2): 126 ML
BH CV ECHO MEAS - EDV(MOD-SP4): 124 ML
BH CV ECHO MEAS - EF(MOD-SP2): 53.2 %
BH CV ECHO MEAS - EF(MOD-SP4): 54.8 %
BH CV ECHO MEAS - ESV(CUBED): 35.5 ML
BH CV ECHO MEAS - ESV(MOD-SP2): 59 ML
BH CV ECHO MEAS - ESV(MOD-SP4): 56 ML
BH CV ECHO MEAS - FS: 31 %
BH CV ECHO MEAS - IVS/LVPW: 1.13 CM
BH CV ECHO MEAS - IVSD: 1.23 CM
BH CV ECHO MEAS - LAT PEAK E' VEL: 14.7 CM/SEC
BH CV ECHO MEAS - LV DIASTOLIC VOL/BSA (35-75): 59.8 CM2
BH CV ECHO MEAS - LV MASS(C)D: 205.6 GRAMS
BH CV ECHO MEAS - LV MAX PG: 3.9 MMHG
BH CV ECHO MEAS - LV MEAN PG: 2.09 MMHG
BH CV ECHO MEAS - LV SYSTOLIC VOL/BSA (12-30): 27 CM2
BH CV ECHO MEAS - LV V1 MAX: 99.2 CM/SEC
BH CV ECHO MEAS - LV V1 VTI: 21.8 CM
BH CV ECHO MEAS - LVIDD: 4.8 CM
BH CV ECHO MEAS - LVIDS: 3.3 CM
BH CV ECHO MEAS - LVOT AREA: 4.1 CM2
BH CV ECHO MEAS - LVOT DIAM: 2.28 CM
BH CV ECHO MEAS - LVPWD: 1.09 CM
BH CV ECHO MEAS - MED PEAK E' VEL: 6.9 CM/SEC
BH CV ECHO MEAS - MR MAX PG: 153 MMHG
BH CV ECHO MEAS - MR MAX VEL: 618.6 CM/SEC
BH CV ECHO MEAS - MV A DUR: 0.09 SEC
BH CV ECHO MEAS - MV A MAX VEL: 55.5 CM/SEC
BH CV ECHO MEAS - MV DEC SLOPE: 466.9 CM/SEC2
BH CV ECHO MEAS - MV DEC TIME: 203 SEC
BH CV ECHO MEAS - MV E MAX VEL: 83.4 CM/SEC
BH CV ECHO MEAS - MV E/A: 1.5
BH CV ECHO MEAS - MV MAX PG: 3.4 MMHG
BH CV ECHO MEAS - MV MEAN PG: 1.2 MMHG
BH CV ECHO MEAS - MV P1/2T: 60.4 MSEC
BH CV ECHO MEAS - MV V2 VTI: 28.5 CM
BH CV ECHO MEAS - MVA(P1/2T): 3.6 CM2
BH CV ECHO MEAS - MVA(VTI): 3.1 CM2
BH CV ECHO MEAS - PA ACC TIME: 0.08 SEC
BH CV ECHO MEAS - PA V2 MAX: 98.6 CM/SEC
BH CV ECHO MEAS - PULM A REVS DUR: 0.08 SEC
BH CV ECHO MEAS - PULM A REVS VEL: 18.8 CM/SEC
BH CV ECHO MEAS - PULM DIAS VEL: 57.8 CM/SEC
BH CV ECHO MEAS - PULM S/D: 0.88
BH CV ECHO MEAS - PULM SYS VEL: 50.6 CM/SEC
BH CV ECHO MEAS - RAP SYSTOLE: 3 MMHG
BH CV ECHO MEAS - RV MAX PG: 0.68 MMHG
BH CV ECHO MEAS - RV V1 MAX: 41.2 CM/SEC
BH CV ECHO MEAS - RV V1 VTI: 10 CM
BH CV ECHO MEAS - RVSP: 21 MMHG
BH CV ECHO MEAS - SV(LVOT): 88.8 ML
BH CV ECHO MEAS - SV(MOD-SP2): 67 ML
BH CV ECHO MEAS - SV(MOD-SP4): 68 ML
BH CV ECHO MEAS - SVI(LVOT): 42.8 ML/M2
BH CV ECHO MEAS - SVI(MOD-SP2): 32.3 ML/M2
BH CV ECHO MEAS - SVI(MOD-SP4): 32.8 ML/M2
BH CV ECHO MEAS - TAPSE (>1.6): 1.31 CM
BH CV ECHO MEAS - TR MAX PG: 17.7 MMHG
BH CV ECHO MEAS - TR MAX VEL: 210.3 CM/SEC
BH CV ECHO MEASUREMENTS AVERAGE E/E' RATIO: 7.72
BH CV XLRA - TDI S': 7.7 CM/SEC
BUN SERPL-MCNC: 23 MG/DL (ref 8–23)
BUN/CREAT SERPL: 19 (ref 7–25)
CALCIUM SPEC-SCNC: 8.6 MG/DL (ref 8.6–10.5)
CHLORIDE SERPL-SCNC: 101 MMOL/L (ref 98–107)
CO2 SERPL-SCNC: 24.1 MMOL/L (ref 22–29)
CREAT SERPL-MCNC: 1.21 MG/DL (ref 0.76–1.27)
DEPRECATED RDW RBC AUTO: 46.3 FL (ref 37–54)
EGFRCR SERPLBLD CKD-EPI 2021: 60.2 ML/MIN/1.73
ERYTHROCYTE [DISTWIDTH] IN BLOOD BY AUTOMATED COUNT: 14.4 % (ref 12.3–15.4)
GLUCOSE BLDC GLUCOMTR-MCNC: 106 MG/DL (ref 70–130)
GLUCOSE BLDC GLUCOMTR-MCNC: 110 MG/DL (ref 70–130)
GLUCOSE BLDC GLUCOMTR-MCNC: 98 MG/DL (ref 70–130)
GLUCOSE BLDC GLUCOMTR-MCNC: 98 MG/DL (ref 70–130)
GLUCOSE SERPL-MCNC: 93 MG/DL (ref 65–99)
HCT VFR BLD AUTO: 38.4 % (ref 37.5–51)
HGB BLD-MCNC: 13 G/DL (ref 13–17.7)
LEFT ATRIUM VOLUME INDEX: 25.7 ML/M2
LV EF BIPLANE MOD: 53.9 %
MAGNESIUM SERPL-MCNC: 2 MG/DL (ref 1.6–2.4)
MCH RBC QN AUTO: 30 PG (ref 26.6–33)
MCHC RBC AUTO-ENTMCNC: 33.9 G/DL (ref 31.5–35.7)
MCV RBC AUTO: 88.7 FL (ref 79–97)
PHOSPHATE SERPL-MCNC: 2.4 MG/DL (ref 2.5–4.5)
PLATELET # BLD AUTO: 120 10*3/MM3 (ref 140–450)
PMV BLD AUTO: 8.5 FL (ref 6–12)
POTASSIUM SERPL-SCNC: 3.5 MMOL/L (ref 3.5–5.2)
RBC # BLD AUTO: 4.33 10*6/MM3 (ref 4.14–5.8)
SODIUM SERPL-SCNC: 137 MMOL/L (ref 136–145)
WBC NRBC COR # BLD AUTO: 4.28 10*3/MM3 (ref 3.4–10.8)

## 2025-02-05 PROCEDURE — 99214 OFFICE O/P EST MOD 30 MIN: CPT | Performed by: INTERNAL MEDICINE

## 2025-02-05 PROCEDURE — 97165 OT EVAL LOW COMPLEX 30 MIN: CPT

## 2025-02-05 PROCEDURE — G0378 HOSPITAL OBSERVATION PER HR: HCPCS

## 2025-02-05 PROCEDURE — 85027 COMPLETE CBC AUTOMATED: CPT | Performed by: INTERNAL MEDICINE

## 2025-02-05 PROCEDURE — 97162 PT EVAL MOD COMPLEX 30 MIN: CPT

## 2025-02-05 PROCEDURE — 97530 THERAPEUTIC ACTIVITIES: CPT

## 2025-02-05 PROCEDURE — 93306 TTE W/DOPPLER COMPLETE: CPT | Performed by: INTERNAL MEDICINE

## 2025-02-05 PROCEDURE — 82948 REAGENT STRIP/BLOOD GLUCOSE: CPT

## 2025-02-05 PROCEDURE — 93306 TTE W/DOPPLER COMPLETE: CPT

## 2025-02-05 PROCEDURE — 80048 BASIC METABOLIC PNL TOTAL CA: CPT | Performed by: INTERNAL MEDICINE

## 2025-02-05 PROCEDURE — 84100 ASSAY OF PHOSPHORUS: CPT | Performed by: STUDENT IN AN ORGANIZED HEALTH CARE EDUCATION/TRAINING PROGRAM

## 2025-02-05 PROCEDURE — 97535 SELF CARE MNGMENT TRAINING: CPT

## 2025-02-05 PROCEDURE — 83735 ASSAY OF MAGNESIUM: CPT | Performed by: STUDENT IN AN ORGANIZED HEALTH CARE EDUCATION/TRAINING PROGRAM

## 2025-02-05 PROCEDURE — 94799 UNLISTED PULMONARY SVC/PX: CPT

## 2025-02-05 PROCEDURE — 36415 COLL VENOUS BLD VENIPUNCTURE: CPT | Performed by: INTERNAL MEDICINE

## 2025-02-05 RX ORDER — GUAIFENESIN 600 MG/1
600 TABLET, EXTENDED RELEASE ORAL EVERY 12 HOURS SCHEDULED
Status: DISCONTINUED | OUTPATIENT
Start: 2025-02-05 | End: 2025-02-06 | Stop reason: HOSPADM

## 2025-02-05 RX ORDER — ATORVASTATIN CALCIUM 20 MG/1
40 TABLET, FILM COATED ORAL DAILY
Status: DISCONTINUED | OUTPATIENT
Start: 2025-02-05 | End: 2025-02-06 | Stop reason: HOSPADM

## 2025-02-05 RX ADMIN — APIXABAN 5 MG: 5 TABLET, FILM COATED ORAL at 20:54

## 2025-02-05 RX ADMIN — OSELTAMIVIR PHOSPHATE 30 MG: 30 CAPSULE ORAL at 20:54

## 2025-02-05 RX ADMIN — APIXABAN 2.5 MG: 2.5 TABLET, FILM COATED ORAL at 09:25

## 2025-02-05 RX ADMIN — DIBASIC SODIUM PHOSPHATE, MONOBASIC POTASSIUM PHOSPHATE AND MONOBASIC SODIUM PHOSPHATE 2 TABLET: 852; 155; 130 TABLET ORAL at 20:54

## 2025-02-05 RX ADMIN — ATORVASTATIN CALCIUM 40 MG: 20 TABLET, FILM COATED ORAL at 18:47

## 2025-02-05 RX ADMIN — GUAIFENESIN 600 MG: 600 TABLET, MULTILAYER, EXTENDED RELEASE ORAL at 18:47

## 2025-02-05 RX ADMIN — DIBASIC SODIUM PHOSPHATE, MONOBASIC POTASSIUM PHOSPHATE AND MONOBASIC SODIUM PHOSPHATE 2 TABLET: 852; 155; 130 TABLET ORAL at 09:25

## 2025-02-05 RX ADMIN — Medication 3 ML: at 20:55

## 2025-02-05 RX ADMIN — Medication 3 ML: at 09:25

## 2025-02-05 RX ADMIN — METOPROLOL SUCCINATE 25 MG: 25 TABLET, EXTENDED RELEASE ORAL at 09:25

## 2025-02-05 RX ADMIN — ACETAMINOPHEN 650 MG: 325 TABLET, FILM COATED ORAL at 20:54

## 2025-02-05 RX ADMIN — OSELTAMIVIR PHOSPHATE 30 MG: 30 CAPSULE ORAL at 09:25

## 2025-02-05 RX ADMIN — PRAVASTATIN SODIUM 20 MG: 20 TABLET ORAL at 09:25

## 2025-02-05 RX ADMIN — ACETAMINOPHEN 650 MG: 325 TABLET, FILM COATED ORAL at 09:25

## 2025-02-05 NOTE — PROGRESS NOTES
[facility] Clinical Pharmacy Services: Oseltamivir Consult    Pt Name: Carlton Núñez   : 1945  Weight: 82.6 kg (182 lb)      Relevant clinical data and objective history reviewed:    Past Medical History:   Diagnosis Date    Allergy     Atrial fibrillation     Colon cancer     Coronary artery disease     Heart attack     High cholesterol     History of being hospitalized     Heart, colon cancer, hernia    History of cardiac cath     2016 - Two vessel CAD with patent LIMA/KARIE graftsto LAD and RCA. Culprit vessel likely diagonal with 95% ostial lesion, too small for intervension, best treated medically. LCX 60% proximal but no flow limiting. EF 55-60%.    History of echocardiogram     17 - Mild concentric LVH. EF 55-60%. Mildly dilated LA. Moderate AR. Mild to moderate MR. Mild TR ad DE    History of nuclear stress test     11/10/16 - Mild inferior wall hypokinesis. Inferoapical wall hypokinesis. The overall EF is 56%. Study suggestive of prior inferior wall infarction with a mild to moderate area of ischemia remaining.    Hyperlipidemia     Hypertension     Irregular heart beat     Kidney stones     Myocardial infarction     LUCY (obstructive sleep apnea)      Creatinine   Date Value Ref Range Status   2025 1.71 (H) 0.76 - 1.27 mg/dL Final   2020 1.22 0.76 - 1.27 mg/dL Final     BUN   Date Value Ref Range Status   2025 27 (H) 8 - 23 mg/dL Final     Estimated Creatinine Clearance: 39.6 mL/min (A) (by C-G formula based on SCr of 1.71 mg/dL (H)).  Temp Readings from Last 3 Encounters:   25 99.3 °F (37.4 °C)   20 98.6 °F (37 °C) (Temporal)   16 98.4 °F (36.9 °C) (Oral)        Due to the national shortage of oseltamivir (Tamiflu) restriction criteria on inpatient and ED use has been implemented in order to preserve stock for those who are at the highest risk of experiencing poor outcomes secondary to influenza    Restriction criteria   -(+) influenza  PCR  -Symptom onset within 48 hrs  -Has one or more of the health and age risk factors that are known to increase a person’s risk of getting serious flu complications as outlined by the CDC    -Adults 65 years and older  -Children younger than 2 years old1  -Asthma  -Neurologic and neurodevelopment conditions  -Blood disorders (such as sickle cell disease)  -Chronic lung disease (such as chronic obstructive pulmonary disease [COPD] and  cystic fibrosis)  -Endocrine disorders (such as diabetes mellitus)  -Heart disease (such as congenital heart disease, congestive heart failure and coronary artery disease)  -Kidney diseases  -Liver disorders  -Metabolic disorders (such as inherited metabolic disorders and mitochondrial disorders)  -People who are obese with a body mass index [BMI] of 40 or higher  -People younger than 19 years old on long-term aspirin- or salicylate-containing medications.  -People with a weakened immune system due to disease (such as people with HIV or AIDS, or some cancers such as leukemia) or medications (such as those receiving chemotherapy or radiation treatment for cancer, or persons with chronic conditions requiring chronic corticosteroids or other drugs that suppress the immune system)  -People who have had a stroke  -Pregnancy     Per ordering providers consult, Carlton Núñez meets criteria for receiving Oseltamivir for the treatment of influenza     Assessment/Plan    Ordered Oseltamivir 30 mg every 12 hrs for a total of 5 days. Will monitor and adjust if culture data or pertinent lab values indicate this is best for the patient.     Pharmacy will discontinue this consult but will continue to follow for any necessary dose adjustments. Please contact pharmacy with any questions or concerns.     Lazarus Kimble III, [facility]  Clinical Pharmacist

## 2025-02-05 NOTE — NURSING NOTE
"Nursing report ED to floor  [facility]  79 y.o.  male    HPI :  HPI  Stated Reason for Visit: flu symptoms, cough and fever x4 days. has been around family who was recently diagnosed with flu  History Obtained From: patient    Chief Complaint  Chief Complaint   Patient presents with    Flu Symptoms       Admitting doctor:   Ronny Rick MD    Admitting diagnosis:   The primary encounter diagnosis was Influenza A. Diagnoses of Acute renal insufficiency, Elevated troponin, Generalized weakness, Frequent falls, and Atrial fibrillation, unspecified type were also pertinent to this visit.    Code status:   Current Code Status       Date Active Code Status Order ID Comments User Context       2/4/2025 2034 CPR (Attempt to Resuscitate) [id number]  Ronny Rick MD ED        Question Answer    Code Status (Patient has no pulse and is not breathing) CPR (Attempt to Resuscitate)    Medical Interventions (Patient has pulse or is breathing) Full Support    Level Of Support Discussed With Patient                    Allergies:   Contrast dye (echo or unknown ct/mr), Phenergan [promethazine hcl], Hydralazine, Iodinated contrast media, Promethazine, and Clonidine    Isolation:   Droplet    Intake and Output  No intake or output data in the 24 hours ending 02/04/25 2054    Weight:       02/04/25 2043   Weight: 82.6 kg (182 lb)       Most recent vitals:   Vitals:    02/04/25 1712 02/04/25 2043   BP: 106/58    Pulse: 93    Resp: 16    Temp: 99.3 °F (37.4 °C)    SpO2: 93%    Weight:  82.6 kg (182 lb)   Height:  182.9 cm (72\")       Active LDAs/IV Access:   Lines, Drains & Airways       Active LDAs       Name Placement date Placement time Site Days    Peripheral IV 02/04/25 1812 Anterior;Left;Proximal Forearm 02/04/25 1812  Forearm  less than 1                    Labs (abnormal labs have a star):   Labs Reviewed   RESPIRATORY PANEL PCR W/ COVID-19 (SARS-COV-2), NP SWAB IN UTM/VTP, 2 HR TAT - Abnormal; " Notable for the following components:       Result Value    Influenza A H1 2009 PCR Detected (*)     All other components within normal limits    Narrative:     In the setting of a positive respiratory panel with a viral infection PLUS a negative procalcitonin without other underlying concern for bacterial infection, consider observing off antibiotics or discontinuation of antibiotics and continue supportive care. If the respiratory panel is positive for atypical bacterial infection (Bordetella pertussis, Chlamydophila pneumoniae, or Mycoplasma pneumoniae), consider antibiotic de-escalation to target atypical bacterial infection.   COMPREHENSIVE METABOLIC PANEL - Abnormal; Notable for the following components:    Glucose 151 (*)     BUN 27 (*)     Creatinine 1.71 (*)     Albumin 3.4 (*)     eGFR 39.7 (*)     All other components within normal limits    Narrative:     GFR Categories in Chronic Kidney Disease (CKD)      GFR Category          GFR (mL/min/1.73)    Interpretation  G1                     90 or greater         Normal or high (1)  G2                      60-89                Mild decrease (1)  G3a                   45-59                Mild to moderate decrease  G3b                   30-44                Moderate to severe decrease  G4                    15-29                Severe decrease  G5                    14 or less           Kidney failure          (1)In the absence of evidence of kidney disease, neither GFR category G1 or G2 fulfill the criteria for CKD.    eGFR calculation 2021 CKD-EPI creatinine equation, which does not include race as a factor   CBC WITH AUTO DIFFERENTIAL - Abnormal; Notable for the following components:    Platelets 132 (*)     Neutrophil % 90.9 (*)     Lymphocyte % 3.3 (*)     Lymphocytes, Absolute 0.24 (*)     All other components within normal limits   TROPONIN - Abnormal; Notable for the following components:    HS Troponin T 79 (*)     All other components within normal  limits    Narrative:     High Sensitive Troponin T Reference Range:  <14.0 ng/L- Negative Female for AMI  <22.0 ng/L- Negative Male for AMI  >=14 - Abnormal Female indicating possible myocardial injury.  >=22 - Abnormal Male indicating possible myocardial injury.   Clinicians would have to utilize clinical acumen, EKG, Troponin, and serial changes to determine if it is an Acute Myocardial Infarction or myocardial injury due to an underlying chronic condition.        BNP (IN-HOUSE) - Normal    Narrative:     This assay is used as an aid in the diagnosis of individuals suspected of having heart failure. It can be used as an aid in the diagnosis of acute decompensated heart failure (ADHF) in patients presenting with signs and symptoms of ADHF to the emergency department (ED). In addition, NT-proBNP of <300 pg/mL indicates ADHF is not likely.    Age Range Result Interpretation  NT-proBNP Concentration (pg/mL:      <50             Positive            >450                   Gray                 300-450                    Negative             <300    50-75           Positive            >900                  Gray                300-900                  Negative            <300      >75             Positive            >1800                  Gray                300-1800                  Negative            <300   HIGH SENSITIVITIY TROPONIN T 1HR   HEMOGLOBIN A1C   POCT GLUCOSE FINGERSTICK   POCT GLUCOSE FINGERSTICK   POCT GLUCOSE FINGERSTICK   POCT GLUCOSE FINGERSTICK   CBC AND DIFFERENTIAL    Narrative:     The following orders were created for panel order CBC & Differential.  Procedure                               Abnormality         Status                     ---------                               -----------         ------                     CBC Auto Differential[[id number]]        Abnormal            Final result                 Please view results for these tests on the individual orders.       EKG:   ECG 12 Lead  Dyspnea   Preliminary Result   HEART RATE=80  bpm   RR Rjkdrmdd=229  ms   NJ Interval=  ms   P Horizontal Axis=  deg   P Front Axis=  deg   QRSD Tszdymtf=018  ms   QT Otacitng=608  ms   BJtP=273  ms   QRS Axis=-52  deg   T Wave Axis=100  deg   - ABNORMAL ECG -   Atrial fibrillation   Left anterior fascicular block   Nonspecific T abnormalities, lateral leads   Date and Time of Study:2025-02-04 19:25:13          Meds given in ED:   Medications   sodium chloride 0.9 % flush 3 mL (has no administration in time range)   sodium chloride 0.9 % flush 3-10 mL (has no administration in time range)   sodium chloride 0.9 % infusion 40 mL (has no administration in time range)   acetaminophen (TYLENOL) tablet 650 mg (has no administration in time range)     Or   acetaminophen (TYLENOL) 160 MG/5ML oral solution 650 mg (has no administration in time range)     Or   acetaminophen (TYLENOL) suppository 650 mg (has no administration in time range)   famotidine (PEPCID) tablet 10 mg (has no administration in time range)   sennosides-docusate (PERICOLACE) 8.6-50 MG per tablet 2 tablet (has no administration in time range)     And   polyethylene glycol (MIRALAX) packet 17 g (has no administration in time range)     And   bisacodyl (DULCOLAX) EC tablet 5 mg (has no administration in time range)     And   bisacodyl (DULCOLAX) suppository 10 mg (has no administration in time range)   ondansetron ODT (ZOFRAN-ODT) disintegrating tablet 4 mg (has no administration in time range)     Or   ondansetron (ZOFRAN) injection 4 mg (has no administration in time range)   melatonin tablet 2.5 mg (has no administration in time range)   Enoxaparin Sodium (LOVENOX) syringe 30 mg (has no administration in time range)   acetaminophen (TYLENOL) tablet 650 mg (has no administration in time range)   sodium chloride 0.9 % infusion (has no administration in time range)   oseltamivir (TAMIFLU) capsule 75 mg (has no administration in time range)   aspirin EC  tablet 81 mg (has no administration in time range)   empagliflozin (JARDIANCE) tablet 10 mg ( Oral Dose Auto Held 2/13/25 0900)   metoprolol succinate XL (TOPROL-XL) 24 hr tablet 25 mg (has no administration in time range)   pravastatin (PRAVACHOL) tablet 20 mg (has no administration in time range)   Pharmacy to Dose Oseltamivir (TAMIFLU) (has no administration in time range)   dextrose (GLUTOSE) oral gel 15 g (has no administration in time range)   dextrose (D50W) (25 g/50 mL) IV injection 25 g (has no administration in time range)   glucagon (GLUCAGEN) injection 1 mg (has no administration in time range)   insulin lispro (HUMALOG/ADMELOG) injection 2-9 Units (has no administration in time range)   sodium chloride 0.9 % bolus 1,000 mL (1,000 mL Intravenous New Bag 2/4/25 1812)       Imaging results:  XR Chest 1 View    Result Date: 2/4/2025  As described.  This report was finalized on 2/4/2025 6:22 PM by Dr. Eligio Gamble M.D on Workstation: [employer]       Ambulatory status:   - assist x1    Social issues:   Social History     Socioeconomic History    Marital status:    Tobacco Use    Smoking status: Never    Smokeless tobacco: Never   Substance and Sexual Activity    Alcohol use: Yes    Drug use: Never    Sexual activity: Defer       Peripheral Neurovascular  Peripheral Neurovascular (Adult)  Peripheral Neurovascular WDL: WDL, neurovascular assessment upper, neurovascular assessment lower  LUE Neurovascular Assessment  Temperature LUE: warm  Color LUE: no discoloration  Sensation LUE: no tenderness, no tingling, no numbness  RUE Neurovascular Assessment  Temperature RUE: warm  Color RUE: no discoloration  Sensation RUE: no numbness, no tenderness, no tingling  LLE Neurovascular Assessment  Temperature LLE: warm  Color LLE: no discoloration  Sensation LLE: no numbness, no tenderness, no tingling  RLE Neurovascular Assessment  Temperature RLE: warm  Color RLE: no discoloration  Sensation RLE: no numbness,  no tenderness, no tingling    Neuro Cognitive  Neuro Cognitive (Adult)  Cognitive/Neuro/Behavioral WDL: WDL, orientation  Orientation: oriented x 4    Learning  Learning Assessment  Learning Readiness and Ability: no barriers identified  Education Provided  Person Taught: patient, spouse  Teaching Method: verbal instruction  Teaching Focus: symptom/problem overview  Education Outcome Evaluation: eager to learn, acceptance expressed, verbalizes understanding    Respiratory  Respiratory  Airway WDL: WDL  Respiratory WDL  Respiratory WDL: .WDL except, cough, rhythm/pattern  Rhythm/Pattern, Respiratory: unlabored, pattern regular, depth regular, no shortness of breath reported  Cough Frequency: infrequent  Cough Type: dry    Abdominal Pain       Pain Assessments  Pain (Adult)  (0-10) Pain Rating: Rest: 0    NIH Stroke Scale       Naveen Ayoub RN  02/04/25 20:54 EST

## 2025-02-05 NOTE — THERAPY EVALUATION
Patient Name: Carlton Núñez  : 1945    MRN: [id number]                              Today's Date: 2025       Admit Date: 2025    Visit Dx:     ICD-10-CM ICD-9-CM   1. Influenza A  J10.1 487.1   2. Acute renal insufficiency  N28.9 593.9   3. Elevated troponin  R79.89 790.6   4. Generalized weakness  R53.1 780.79   5. Frequent falls  R29.6 V15.88   6. Atrial fibrillation, unspecified type  I48.91 427.31   7. Decreased activities of daily living (ADL)  Z78.9 V49.89     Patient Active Problem List   Diagnosis    Hyperlipidemia    LUCY (obstructive sleep apnea)    Hypertension    Myocardial infarction    Coronary artery disease    Colon cancer    History of echocardiogram    History of nuclear stress test    History of cardiac cath    History of coronary artery bypass surgery    Moderate aortic insufficiency    Fatigue    Respiratory insufficiency    Palpitations    Abnormal nuclear stress test    Influenza A    Dehydration    LAUREANO (acute kidney injury)    Multiple falls    Influenza A with respiratory manifestations    Stage 3a chronic kidney disease    Type 2 diabetes mellitus with hyperglycemia, without long-term current use of insulin    History of colon cancer     Past Medical History:   Diagnosis Date    Allergy     Atrial fibrillation     Colon cancer     Coronary artery disease     Diabetes mellitus     Heart attack     High cholesterol     History of being hospitalized     Heart, colon cancer, hernia    History of cardiac cath     2016 - Two vessel CAD with patent LIMA/KARIE graftsto LAD and RCA. Culprit vessel likely diagonal with 95% ostial lesion, too small for intervension, best treated medically. LCX 60% proximal but no flow limiting. EF 55-60%.    History of echocardiogram     17 - Mild concentric LVH. EF 55-60%. Mildly dilated LA. Moderate AR. Mild to moderate MR. Mild TR ad CA    History of nuclear stress test     11/10/16 - Mild inferior wall hypokinesis. Inferoapical wall  hypokinesis. The overall EF is 56%. Study suggestive of prior inferior wall infarction with a mild to moderate area of ischemia remaining.    Hyperlipidemia     Hypertension     Irregular heart beat     Kidney stones     Myocardial infarction     LUCY (obstructive sleep apnea)      Past Surgical History:   Procedure Laterality Date    CARDIAC CATHETERIZATION N/A 6/23/2020    Procedure: Left Heart Cath;  Surgeon: Alton Coronado MD;  Location: [facility] CATH INVASIVE LOCATION;  Service: Cardiovascular;  Laterality: N/A;    CARDIAC CATHETERIZATION N/A 6/23/2020    Procedure: Coronary angiography;  Surgeon: Alton Coronado MD;  Location:  [facility] CATH INVASIVE LOCATION;  Service: Cardiovascular;  Laterality: N/A;    CARDIAC CATHETERIZATION N/A 6/23/2020    Procedure: Left ventriculography;  Surgeon: Alton Coronado MD;  Location: [facility] CATH INVASIVE LOCATION;  Service: Cardiovascular;  Laterality: N/A;    CARDIAC CATHETERIZATION N/A 6/23/2020    Procedure: Native mammary injection;  Surgeon: Alton Coronado MD;  Location: [facility] CATH INVASIVE LOCATION;  Service: Cardiovascular;  Laterality: N/A;    CATARACT EXTRACTION WITH INTRAOCULAR LENS IMPLANT Bilateral     COLON RESECTION      CORONARY ARTERY BYPASS GRAFT  12/01/1996    LIMA to LAD and KARIE to RCA, performed by Dr Barney.    INGUINAL HERNIA REPAIR Bilateral     SINUS SURGERY      TONSILLECTOMY        General Information       Row Name 02/05/25 1659          OT Time and Intention    Subjective Information no complaints  -ES     Document Type evaluation  -ES     Mode of Treatment individual therapy;occupational therapy  -ES     Patient Effort excellent  -ES       Row Name 02/05/25 9243          General Information    Patient Profile Reviewed yes  -ES     Prior Level of Function independent:;ADL's;all household mobility  Patient reports he is independent with ADLs at baseline. No device for functional mobility. Standing shower and grooming completion. no home O2 use.   -ES     Existing Precautions/Restrictions fall  -ES     Barriers to Rehab none identified  -ES       Row Name 02/05/25 1551          Living Environment    People in Home spouse  -ES       Row Name 02/05/25 1551          Home Main Entrance    Number of Stairs, Main Entrance three  -ES       Row Name 02/05/25 1551          Stairs Within Home, Primary    Stairs, Within Home, Primary flight of stairs within home to bedroom located on second floor  -ES       Row Name 02/05/25 1551          Cognition    Orientation Status (Cognition) oriented x 3  -ES               User Key  (r) = Recorded By, (t) = Taken By, (c) = Cosigned By      Initials Name Provider Type    ES Ashlyn Borden, OTR/L, CSRS Occupational Therapist                     Mobility/ADL's       Row Name 02/05/25 1552          Bed Mobility    Bed Mobility supine-sit;sit-supine  -ES     All Activities, [place] (Bed Mobility) supervision  -ES     Supine-Sit [place] (Bed Mobility) supervision  -ES       Row Name 02/05/25 1552          Transfers    Transfers sit-stand transfer;stand-sit transfer;toilet transfer  -ES       Row Name 02/05/25 1552          Sit-Stand Transfer    Sit-Stand [place] (Transfers) standby assist  -ES     Assistive Device (Sit-Stand Transfers) other (see comments)  no AD  -ES       Row Name 02/05/25 1552          Stand-Sit Transfer    Stand-Sit [place] (Transfers) standby assist  -ES     Assistive Device (Stand-Sit Transfers) other (see comments)  no AD  -ES       Row Name 02/05/25 1552          Toilet Transfer    Type (Toilet Transfer) sit-stand;stand-sit  -ES     [place] Level (Toilet Transfer) standby assist  -ES     Assistive Device (Toilet Transfer) other (see comments);commode  no AD  -ES       Row Name 02/05/25 1552          Functional Mobility    Functional Mobility- Ind. Level standby assist  -ES     Functional Mobility- Device other (see comments)  no AD  -ES     Functional Mobility- Comment patient performs  functional mobility to bathroom and around room, SBA without use of AD.  -ES       Row Name 02/05/25 1552          Activities of Daily Living    BADL Assessment/Intervention grooming;toileting  -ES       Row Name 02/05/25 1552          Grooming Assessment/Training    [place] Level (Grooming) grooming skills;wash face, hands;standby assist  -ES     Position (Grooming) sink side;unsupported standing  -ES       Row Name 02/05/25 1552          Toileting Assessment/Training    [place] Level (Toileting) toileting skills;adjust/manage clothing;perform perineal hygiene;standby assist  -ES     Position (Toileting) unsupported sitting;unsupported standing  -ES               User Key  (r) = Recorded By, (t) = Taken By, (c) = Cosigned By      Initials Name Provider Type    ES Ashlyn Borden, OTR/L, CSRS Occupational Therapist                   Obj/Interventions       Row Name 02/05/25 1553          Sensory Assessment (Somatosensory)    Sensory Assessment (Somatosensory) sensation intact  -ES       Row Name 02/05/25 1553          Vision Assessment/Intervention    Visual Impairment/Limitations WFL  -ES       Row Name 02/05/25 1553          Range of Motion Comprehensive    General Range of Motion bilateral upper extremity ROM WNL  -ES       Row Name 02/05/25 1553          Strength Comprehensive (MMT)    General Manual Muscle Testing (MMT) Assessment no strength deficits identified  -ES       Row Name 02/05/25 1553          Motor Skills    Motor Skills functional endurance  -ES     Functional Endurance fair plus  -ES       Row Name 02/05/25 1553          Balance    Balance Assessment sitting dynamic balance;standing dynamic balance  -ES     Dynamic Sitting Balance independent  -ES     Position, Sitting Balance unsupported;sitting edge of bed  -ES     Dynamic Standing Balance standby assist  -ES     Position/Device Used, Standing Balance unsupported;other (see comments)  no AD  -ES               User Key  (r) = Recorded By,  (t) = Taken By, (c) = Cosigned By      Initials Name Provider Type    Ashlyn Alcantara, OTR/L, CSRS Occupational Therapist                   Goals/Plan    No documentation.                  Clinical Impression       Row Name 02/05/25 1553          Pain Assessment    Pre/Posttreatment Pain Comment patient with no complaints of pain  -ES       Row Name 02/05/25 1553          Plan of Care Review    Plan of Care Reviewed With patient  -ES     Progress no change  -ES     Outcome Evaluation Patient is 79 year old male presenting to [facility] on 2/4/2025 with reports of 3-4 day history of cough, fevers, and chills with generalized weakness. Patient PMH significant for CAD, DM, HTN, LUCY. At baseline, patient reports he is independent with ADLs and does not use a device for functional mobility. Today, patient is SBA with bed mobility, SBA with transfers without use of AD. Patient is able to complete grooming in stance at sinkside and toileting task requiring no more than SBA without use of AD. Patient presents at or near baseline functional status at time of evaluation with no identified functional deficits that impede patient independence with activities of daily living.  No  indicated need for skilled occupational therapy intervention in the acute care setting.  Occupational therapy will sign off at this time.  -ES       Row Name 02/05/25 1553          Therapy Assessment/Plan (OT)    Criteria for Skilled Therapeutic Interventions Met (OT) no problems identified which require skilled intervention  -ES     Therapy Frequency (OT) evaluation only  -ES       Row Name 02/05/25 1553          Therapy Plan Review/Discharge Plan (OT)    Anticipated Discharge Disposition (OT) home  -ES       Row Name 02/05/25 1553          Positioning and Restraints    Pre-Treatment Position in bed  -ES     Post Treatment Position bed  -ES     In Bed fowlers;call light within reach;encouraged to call for assist  -ES               User  Key  (r) = Recorded By, (t) = Taken By, (c) = Cosigned By      Initials Name Provider Type    ES Ashlyn Borden, OTR/L, CSRS Occupational Therapist                   Outcome Measures       Row Name 02/05/25 1555          How much help from another is currently needed...    Putting on and taking off regular lower body clothing? 4  -ES     Bathing (including washing, rinsing, and drying) 4  -ES     Toileting (which includes using toilet bed pan or urinal) 4  -ES     Putting on and taking off regular upper body clothing 4  -ES     Taking care of personal grooming (such as brushing teeth) 4  -ES     Eating meals 4  -ES     AM-PAC 6 Clicks Score (OT) 24  -ES       Row Name 02/05/25 1456          How much help from another person do you currently need...    Turning from your back to your side while in flat bed without using bedrails? 4  -ER     Moving from lying on back to sitting on the side of a flat bed without bedrails? 4  -ER     Moving to and from a bed to a chair (including a wheelchair)? 3  -ER     Standing up from a chair using your arms (e.g., wheelchair, bedside chair)? 3  -ER     Climbing 3-5 steps with a railing? 3  -ER     To walk in hospital room? 3  -ER     AM-PAC 6 Clicks Score (PT) 20  -ER     Highest Level of Mobility Goal 6 --> Walk 10 steps or more  -ER       Row Name 02/05/25 1555 02/05/25 1456       Functional Assessment    Outcome Measure Options AM-PAC 6 Clicks Daily Activity (OT)  -ES AM-PAC 6 Clicks Basic Mobility (PT)  -ER              User Key  (r) = Recorded By, (t) = Taken By, (c) = Cosigned By      Initials Name Provider Type    Ashlyn Alcantara, OTR/L, CSRS Occupational Therapist    ER Brenda Taveras, PT Physical Therapist                    Occupational Therapy Education        No education to display                  OT Recommendation and Plan  Therapy Frequency (OT): evaluation only  Plan of Care Review  Plan of Care Reviewed With: patient  Progress: no change  Outcome Evaluation: Patient  is 79 year old male presenting to [facility] on 2/4/2025 with reports of 3-4 day history of cough, fevers, and chills with generalized weakness. Patient PMH significant for CAD, DM, HTN, LUCY. At baseline, patient reports he is independent with ADLs and does not use a device for functional mobility. Today, patient is SBA with bed mobility, SBA with transfers without use of AD. Patient is able to complete grooming in stance at sinkside and toileting task requiring no more than SBA without use of AD. Patient presents at or near baseline functional status at time of evaluation with no identified functional deficits that impede patient independence with activities of daily living.  No  indicated need for skilled occupational therapy intervention in the acute care setting.  Occupational therapy will sign off at this time.     Time Calculation:   Evaluation Complexity (OT)  Review Occupational Profile/Medical/Therapy History Complexity: brief/low complexity  Assessment, Occupational Performance/Identification of Deficit Complexity: 3-5 performance deficits  Clinical Decision Making Complexity (OT): detailed assessment/moderate complexity  Overall Complexity of Evaluation (OT): low complexity     Time Calculation- OT       Row Name 02/05/25 1556             Time Calculation- OT    OT Start Time 1045  -ES      OT Stop Time 1110  -ES      OT Time Calculation (min) 25 min  -ES      Total Timed Code Minutes- OT 15 minute(s)  -ES      OT Received On 02/05/25  -ES         Timed Charges    96643 - OT Therapeutic Activity Minutes 5  -ES      20223 - OT Self Care/Mgmt Minutes 10  -ES         Untimed Charges    OT Eval/Re-eval Minutes 10  -ES         Total Minutes    Timed Charges Total Minutes 15  -ES      Untimed Charges Total Minutes 10  -ES       Total Minutes 25  -ES                User Key  (r) = Recorded By, (t) = Taken By, (c) = Cosigned By      Initials Name Provider Type    ES Ashlyn Borden, OTR/BRITTNI, CSRS  Occupational Therapist                  Therapy Charges for Today       Code Description Service Date Service Provider Modifiers Qty    74826778839 HC OT SELF CARE/MGMT/TRAIN EA 15 MIN 2/5/2025 Ashlyn Borden, OTR/L, CSRS GO 1    [id number]  OT EVAL LOW COMPLEXITY 2 2/5/2025 Ashlyn Borden, OTR/L, CSRS GO 1                 Ashlyn Borden OTR/L, CSRS  2/5/2025

## 2025-02-05 NOTE — PROGRESS NOTES
Name: Carlton Núñez ADMIT: 2025   : 1945  PCP: Dylan Chan MD    MRN: [id number] LOS: 0 days   AGE/SEX: 79 y.o. male  ROOM: [facility], [place]     Subjective   Subjective   Patient seen this morning.  Reports he is feeling significantly better compared to admission.  Weakness seems to be improving.  Denies conscious metabolic dizziness.  Continues to have cough, no significant sputum production.    Review of Systems   As above  Objective   Objective   Vital Signs  Temp:  [98.1 °F (36.7 °C)-99.3 °F (37.4 °C)] 98.6 °F (37 °C)  Heart Rate:  [] 61  Resp:  [16-18] 18  BP: (106-149)/(51-76) 131/57  SpO2:  [93 %-97 %] 96 %  on   ;   Device (Oxygen Therapy): room air  Body mass index is 25.59 kg/m².  Physical Exam    General: Alert and oriented x3, no acute distress  HEENT: Normocephalic, atraumatic  CV: Irregular rhythm, normal rate  Lungs: CTA, no wheezing  Abdomen: Soft, nontender, nondistended  Extremities: No significant peripheral edema , no cyanosis     Results Review     I reviewed the patient's new clinical results.  Results from last 7 days   Lab Units 25  0649 25  1811   WBC 10*3/mm3 4.28 7.38   HEMOGLOBIN g/dL 13.0 13.0   PLATELETS 10*3/mm3 120* 132*     Results from last 7 days   Lab Units 25  0649 25  1811   SODIUM mmol/L 137 137   POTASSIUM mmol/L 3.5 3.9   CHLORIDE mmol/L 101 101   CO2 mmol/L 24.1 24.2   BUN mg/dL 23 27*   CREATININE mg/dL 1.21 1.71*   GLUCOSE mg/dL 93 151*   Estimated Creatinine Clearance: 58 mL/min (by C-G formula based on SCr of 1.21 mg/dL).  Results from last 7 days   Lab Units 25  1811   ALBUMIN g/dL 3.4*   BILIRUBIN mg/dL 0.7   ALK PHOS U/L 69   AST (SGOT) U/L 29   ALT (SGPT) U/L 21     Results from last 7 days   Lab Units 25  0649 25  1811   CALCIUM mg/dL 8.6 9.2   ALBUMIN g/dL  --  3.4*   MAGNESIUM mg/dL 2.0  --    PHOSPHORUS mg/dL 2.4*  --        COVID19   Date Value Ref Range Status   2025 Not Detected Not Detected  - Ref. Range Final   11/30/2021 Not Detected Not Detected - Ref. Range Final     Hemoglobin A1C   Date/Time Value Ref Range Status   02/04/2025 1811 6.20 (H) 4.80 - 5.60 % Final     Glucose   Date/Time Value Ref Range Status   02/05/2025 1119 110 70 - 130 mg/dL Final   02/05/2025 0537 98 70 - 130 mg/dL Final   02/04/2025 2303 110 70 - 130 mg/dL Final           XR Chest 1 View  Narrative: XR CHEST 1 VW-     HISTORY: Male who is 79 years-old, cough     TECHNIQUE: Frontal view of the chest     COMPARISON: None available     FINDINGS: Heart, mediastinum and pulmonary vasculature are unremarkable.  Sternotomy wires are present. Small left basilar atelectasis or  infiltrate, follow-up suggested. No pleural effusion, or pneumothorax.  No acute osseous process.     Impression: As described.     This report was finalized on 2/4/2025 6:22 PM by Dr. Eligio Gamble M.D on Workstation: [id number]       Scheduled Medications  acetaminophen, 650 mg, Oral, BID  apixaban, 5 mg, Oral, Q12H  atorvastatin, 40 mg, Oral, Daily  [Held by provider] empagliflozin, 10 mg, Oral, Daily  insulin lispro, 2-9 Units, Subcutaneous, 4x Daily AC & at Bedtime  metoprolol succinate XL, 25 mg, Oral, Daily  oseltamivir, 30 mg, Oral, Q12H  phosphorus, 500 mg, Oral, BID  sodium chloride, 3 mL, Intravenous, Q12H    Infusions  Pharmacy to Dose Oseltamivir (TAMIFLU),     Diet  Diet: Cardiac, Diabetic; Healthy Heart (2-3 Na+); Consistent Carbohydrate; Fluid Consistency: Thin (IDDSI 0)    I have personally reviewed     [x]  Laboratory   [x]  Microbiology   [x]  Radiology   [x]  EKG/Telemetry  [x]  Cardiology/Vascular   []  Pathology    []  Records       Assessment/Plan     Active Hospital Problems    Diagnosis  POA    **Influenza A [J10.1]  Yes    Dehydration [E86.0]  Yes    LAUREANO (acute kidney injury) [N17.9]  Yes    Multiple falls [R29.6]  Not Applicable    Influenza A with respiratory manifestations [J10.1]  Yes    Stage 3a chronic kidney disease [N18.31]   Yes    Type 2 diabetes mellitus with hyperglycemia, without long-term current use of insulin [E11.65]  Yes    History of colon cancer [Z85.038]  Yes    Fatigue [R53.83]  Yes    History of coronary artery bypass surgery [Z95.1]  Not Applicable    Hypertension [I10]  Yes    Hyperlipidemia [E78.5]  Yes    Coronary artery disease [I25.10]  Yes      Resolved Hospital Problems   No resolved problems to display.     1-year-old male presents the hospital with acute influenza A infection, dehydration with LAUREANO, and multiple near falls and worsening generalized weakness.     Influenza infection:  Tamiflu initiated.  Consult pharmacy to assist with dosing.  Supportive care and symptom treatment.  Not currently hypoxic.  Tylenol for fevers and chills.  Order scheduled Mucinex     New onset atrial fibrillation:  EKG reviewed and atrial fibrillation noted.    Continue beta-blocker.  Eliquis anticoagulation with renal dosing.    Cardiology following     Dehydration and LAUREANO with CKD 3A:  Creatinine on admission is 1.71.  Previous records reviewed and creatinine 1.22 in June 2020  Status post IV fluids.  Creatinine improved back to baseline.  Monitor.     Type 2 diabetes:  Hold Jardiance due to dehydration.  Hold metformin with LAUREANO.  Continue sSSI     Essential hypertension:   Continue beta-blocker.  Hold remaining medications with LAUREANO and acute illness due to risk of hypotension.  Monitor blood pressure adjust as needed.  Labetalol as needed for greater than 180.     CAD and history of CABG, hyperlipidemia:   Noted.  No chest pain.  Continue aspirin and statin.     History of colon cancer and partial colectomy:   Noted.  Reports this was several years ago.  No current issues.     Weakness and multiple near falls:  No trauma or injury reported.  PT consult and fall prevention.     Hypophosphatemia  -Phosphorus replacement ordered    Eliquis (home med) for DVT prophylaxis.  Full code.  Discussed with patient  Discussed with RN,  CCP  Disposition: Home, likely tomorrow  Expected Discharge Date: 2/7/2025; Expected Discharge Time:        Copied text in this note has been reviewed and is accurate as of 02/05/25.         Dictated utilizing Dragon dictation        Lupillo Vargas MD  [facility]  02/05/25  15:13 EST

## 2025-02-05 NOTE — H&P
[facility] Medicine Services  HISTORY AND PHYSICAL    Patient Name: Carlton Núñez  : 1945  MRN: [id number]  Primary Care Physician: Dylan Chan MD  Date of admission: 2025    Subjective   Subjective   Chief Complaint:  Flulike symptoms    HPI:  Carlton Núñez is a 79 y.o. male with past medical history as listed presents to the hospital with 3 to 4-day history of cough with fevers and chills and moderate persistent generalized weakness.  Patient had been around family who was diagnosed with influenza.  Patient reported multiple near falls where he helped himself down to the floor and was unable to stand back up.  He denies any injury or head trauma.  Due to his difficulty getting around and severe weakness he feels unable to return home.  Family also note he has had very poor oral intake over the last 2 days with little to eat or drink.      Review of Systems   Positive for fevers, chills, cough, congestion, no shortness of breath  No current nausea, vomiting, or diarrhea  No current chest pain or palpitations    All other systems reviewed and are negative.     Personal History     Past Medical History:   Diagnosis Date    Allergy     Atrial fibrillation     Colon cancer     Coronary artery disease     Diabetes mellitus     Heart attack     High cholesterol     History of being hospitalized     Heart, colon cancer, hernia    History of cardiac cath     2016 - Two vessel CAD with patent LIMA/KARIE graftsto LAD and RCA. Culprit vessel likely diagonal with 95% ostial lesion, too small for intervension, best treated medically. LCX 60% proximal but no flow limiting. EF 55-60%.    History of echocardiogram     17 - Mild concentric LVH. EF 55-60%. Mildly dilated LA. Moderate AR. Mild to moderate MR. Mild TR ad SD    History of nuclear stress test     11/10/16 - Mild inferior wall hypokinesis. Inferoapical wall hypokinesis. The overall EF is 56%. Study suggestive of prior inferior  wall infarction with a mild to moderate area of ischemia remaining.    Hyperlipidemia     Hypertension     Irregular heart beat     Kidney stones     Myocardial infarction     LUCY (obstructive sleep apnea)        Past Surgical History:   Procedure Laterality Date    CARDIAC CATHETERIZATION N/A 6/23/2020    Procedure: Left Heart Cath;  Surgeon: Alton Coronado MD;  Location: [facility] CATH INVASIVE LOCATION;  Service: Cardiovascular;  Laterality: N/A;    CARDIAC CATHETERIZATION N/A 6/23/2020    Procedure: Coronary angiography;  Surgeon: Alton Coronado MD;  Location: [facility] CATH INVASIVE LOCATION;  Service: Cardiovascular;  Laterality: N/A;    CARDIAC CATHETERIZATION N/A 6/23/2020    Procedure: Left ventriculography;  Surgeon: Alton Coronado MD;  Location:  [facility] CATH INVASIVE LOCATION;  Service: Cardiovascular;  Laterality: N/A;    CARDIAC CATHETERIZATION N/A 6/23/2020    Procedure: Native mammary injection;  Surgeon: Alton Coronado MD;  Location: [facility] CATH INVASIVE LOCATION;  Service: Cardiovascular;  Laterality: N/A;    CATARACT EXTRACTION WITH INTRAOCULAR LENS IMPLANT Bilateral     COLON RESECTION      CORONARY ARTERY BYPASS GRAFT  12/01/1996    LIMA to LAD and KARIE to RCA, performed by Dr Barney.    INGUINAL HERNIA REPAIR Bilateral     SINUS SURGERY      TONSILLECTOMY         Family History: family history includes Breast cancer in his mother; Cancer in an other family member; Heart attack in an other family member; Hypertension in an other family member; Other in his daughter; Pancreatic cancer in his mother. Other pertinent FHx was reviewed and unremarkable.     Social History:  reports that he has never smoked. He has never used smokeless tobacco. He reports that he does not currently use alcohol. He reports that he does not use drugs.  Social History     Social History Narrative    Not on file       Medications:  Available home medication information reviewed.    Allergies   Allergen Reactions     Contrast Dye (Echo Or Unknown Ct/Mr) Hives    Phenergan [Promethazine Hcl] Nausea And Vomiting     .    Hydralazine Dizziness    Iodinated Contrast Media Hives    Promethazine GI Intolerance    Clonidine Rash       Objective   Objective   Vital Signs:   Temp:  [98.6 °F (37 °C)-99.3 °F (37.4 °C)] 98.6 °F (37 °C)  Heart Rate:  [89-93] 89  Resp:  [16] 16  BP: (106-133)/(51-58) 133/51        Physical Exam   Constitutional: Awake, alert, elderly appearing  HENT: NCAT, dry membranes moist, neck supple  Respiratory: Cough is present, coarse sounds  Cardiovascular: Pulse rate is normal, palpable radial pulses  Gastrointestinal:  Soft, nontender, nondistended  Musculoskeletal: Somewhat frail and debilitated appearance, BMI is 25  Psychiatric: Mildly anxious affect, cooperative, conversational  Neurologic: No slurred speech or facial droop, follows commands  Skin: No rashes or jaundice, warm      Results from last 7 days   Lab Units 02/04/25  1811   WBC 10*3/mm3 7.38   HEMOGLOBIN g/dL 13.0   HEMATOCRIT % 39.1   PLATELETS 10*3/mm3 132*     Results from last 7 days   Lab Units 02/04/25 2028 02/04/25  1811   SODIUM mmol/L  --  137   POTASSIUM mmol/L  --  3.9   CHLORIDE mmol/L  --  101   CO2 mmol/L  --  24.2   BUN mg/dL  --  27*   CREATININE mg/dL  --  1.71*   GLUCOSE mg/dL  --  151*   CALCIUM mg/dL  --  9.2   ALK PHOS U/L  --  69   ALT (SGPT) U/L  --  21   AST (SGOT) U/L  --  29   HSTROP T ng/L 88* 79*   PROBNP pg/mL  --  1,609.0     Estimated Creatinine Clearance: 41 mL/min (A) (by C-G formula based on SCr of 1.71 mg/dL (H)).  Brief Urine Lab Results       None          Imaging Results (Last 24 Hours)       Procedure Component Value Units Date/Time    XR Chest 1 View [[id number]] Collected: 02/04/25 1821     Updated: 02/04/25 1825    Narrative:      XR CHEST 1 VW-     HISTORY: Male who is 79 years-old, cough     TECHNIQUE: Frontal view of the chest     COMPARISON: None available     FINDINGS: Heart, mediastinum and pulmonary  vasculature are unremarkable.  Sternotomy wires are present. Small left basilar atelectasis or  infiltrate, follow-up suggested. No pleural effusion, or pneumothorax.  No acute osseous process.       Impression:      As described.     This report was finalized on 2/4/2025 6:22 PM by Dr. Eligio Gamble M.D on Workstation: [employer]             Results for orders placed during the hospital encounter of 05/29/20    Adult Transthoracic Echo Complete W/ Cont if Necessary Per Protocol    Interpretation Summary  · Calculated EF = 65.0%.  · The left ventricular cavity is borderline dilated.  · There is moderate calcification of the aortic valve.  · Mild to moderate aortic valve regurgitation is present.  · Mild tricuspid valve regurgitation is present.      Assessment & Plan   Assessment & Plan     Active Hospital Problems    Diagnosis  POA    **Influenza A [J10.1]  Yes    Dehydration [E86.0]  Yes    LAUREANO (acute kidney injury) [N17.9]  Yes    Multiple falls [R29.6]  Not Applicable    Influenza A with respiratory manifestations [J10.1]  Yes    Stage 3a chronic kidney disease [N18.31]  Yes    Type 2 diabetes mellitus with hyperglycemia, without long-term current use of insulin [E11.65]  Yes    History of colon cancer [Z85.038]  Yes    Fatigue [R53.83]  Yes    History of coronary artery bypass surgery [Z95.1]  Not Applicable     12/1/1996-left COLLETTE to LAD, right COLLETTE to the right coronary artery      Hypertension [I10]  Yes    Hyperlipidemia [E78.5]  Yes    Coronary artery disease [I25.10]  Yes     79-year-old male presents the hospital with acute influenza A infection, dehydration with LAUREAON, and multiple near falls and worsening generalized weakness.    Influenza infection:  Tamiflu initiated.  Consult pharmacy to assist with dosing.  Supportive care and symptom treatment.  Not currently hypoxic.  Tylenol for fevers and chills.    New onset atrial fibrillation:  EKG reviewed and atrial fibrillation noted.  Continue  beta-blocker.  Eliquis anticoagulation with renal dosing.  Consult cardiology.      Dehydration and LAUREANO with CKD 3A:  Saline bolus given x 1 L.  Continue IV fluid overnight and reevaluate renal function and electrolytes.  Creatinine upon admission is 1.71.  Previous records reviewed and creatinine 1.22 in June 2020    Type 2 diabetes:  Hold Jardiance due to dehydration.  Hold metformin with LAUREANO.  Hold metformin.  Sliding scale insulin initially.  Monitor glucose and adjust insulin as needed.    Essential hypertension:   Continue beta-blocker.  Hold remaining medications with LAUREANO and acute illness due to risk of hypotension.  Monitor blood pressure adjust as needed.  Labetalol as needed for greater than 180.    CAD and history of CABG, hyperlipidemia:   Noted.  No chest pain.  Continue aspirin and statin.    History of colon cancer and partial colectomy:   Noted.  Reports this was several years ago.  No current issues.    Weakness and multiple near falls:  No trauma or injury reported.  PT consult and fall prevention.    Treatment plan discussed with patient and his family who are in agreement.    DVT prophylaxis: Eliquis    CODE STATUS:    Code Status and Medical Interventions: CPR (Attempt to Resuscitate); Full Support   Ordered at: 02/04/25 2034     Level Of Support Discussed With:    Patient     Code Status (Patient has no pulse and is not breathing):    CPR (Attempt to Resuscitate)     Medical Interventions (Patient has pulse or is breathing):    Full Support       Ronny Rick MD  02/04/25

## 2025-02-05 NOTE — CONSULTS
Date of Hospital Visit: 25  Encounter Provider: Frank Jim MD  Place of Service: [facility] CARDIOLOGY  Patient Name: Carlton Núñez  :1945  [id number]  Referral Provider: No ref. provider found    Chief complaint:AFib    History of Present Illness:  Carlton Núñez is an 79-year-old male with whom I follow in the office, he was previously followed by Dr. Alton Coronado.  He has a pertinent medical history of hypertension, hyperlipidemia, paroxysmal A-fib, moderate aortic insufficiency, and LUCY.  In the  he had a 2 vessel bypass by Dr. Robert Coe at [facility].  In  had a cardiac cath in both his KARIE and LIMA were patent, his circumflex was free from disease with a normal EF, with moderate aortic insufficiency.  He has had no cardiac complaints since.  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    He presents to our ER complaining of acute illness, and tested positive for the flu.  His biggest complaint is a moderate persistent generalized weakness, for which she was admitted.  His initial EKG found him to be in A-fib, for which we are being asked to see him in consult.    He comes in with weakness is found to have the flu.  No chest pain no palpitations no PND orthopnea edema.  Initial high-sensitivity troponin 79, repeat 88  EKG: Atrial fibrillation HR 80  Left anterior fascicular block  Nonspecific  T abnormalities, lateral leads    Echocardiogram 2020  Calculated EF = 65.0%.  The left ventricular cavity is borderline dilated.  There is moderate calcification of the aortic valve.  Mild to moderate aortic valve regurgitation is present.  Mild tricuspid valve regurgitation is present.     Stress test 2020  Diaphragmatic attenuation artifact is present.  Left ventricular ejection fraction is normal (Calculated EF = 67%).  On stress images, there are diminished counts of the radioisotope noted in the inferior to inferior apical wall.  The defect is mild to moderate in size.  On the rest images, there is improved perfusion in the inferior and inferoapical wall.  The study is suggestive of ischemia involving, primarily the inferior wall.  Gated studies demonstrate mild hypokinesis in the inferior wall.  The left ventricle is dilated.  The SSS is 6 and the SDS is 5.    Cardiac cath 6/23/2020  1.  Left ventricle ejection fraction is 55 to 60%  2.  Normal left heart filling pressures.  3.  Significant two-vessel coronary artery disease  4.  LIMA to LAD and KARIE to the right coronary artery were widely patent otherwise borderline or less obstructive disease   Recommendation:   1. Strict risk modification specifically  following medical regimen and lipid control  2. Optimal medical management   3. Exercise program.    Past Medical History:   Diagnosis Date    Allergy     Atrial fibrillation     Colon cancer     Coronary artery disease     Diabetes mellitus     Heart attack     High cholesterol     History of being hospitalized     Heart, colon cancer, hernia    History of cardiac cath     11/28/2016 - Two vessel CAD with patent LIMA/KARIE graftsto LAD and RCA. Culprit vessel likely diagonal with 95% ostial lesion, too small for intervension, best treated medically. LCX 60% proximal but no flow limiting. EF 55-60%.    History of echocardiogram     5/18/17 - Mild concentric LVH. EF 55-60%. Mildly dilated LA. Moderate AR. Mild to moderate MR. Mild TR ad MS    History of nuclear stress test     11/10/16 - Mild inferior wall hypokinesis. Inferoapical wall hypokinesis. The overall EF is 56%. Study suggestive of prior inferior wall infarction with a mild to moderate area of ischemia remaining.    Hyperlipidemia     Hypertension     Irregular heart beat     Kidney stones     Myocardial infarction     LUCY (obstructive sleep apnea)        Past Surgical History:   Procedure Laterality Date    CARDIAC CATHETERIZATION N/A 6/23/2020    Procedure: Left Heart Cath;   Surgeon: Alton Coronado MD;  Location:  [facility] CATH INVASIVE LOCATION;  Service: Cardiovascular;  Laterality: N/A;    CARDIAC CATHETERIZATION N/A 6/23/2020    Procedure: Coronary angiography;  Surgeon: Alton Coronado MD;  Location:  [facility] CATH INVASIVE LOCATION;  Service: Cardiovascular;  Laterality: N/A;    CARDIAC CATHETERIZATION N/A 6/23/2020    Procedure: Left ventriculography;  Surgeon: Alton Coronado MD;  Location:  [facility] CATH INVASIVE LOCATION;  Service: Cardiovascular;  Laterality: N/A;    CARDIAC CATHETERIZATION N/A 6/23/2020    Procedure: Native mammary injection;  Surgeon: Alton Coronado MD;  Location:  [facility] CATH INVASIVE LOCATION;  Service: Cardiovascular;  Laterality: N/A;    CATARACT EXTRACTION WITH INTRAOCULAR LENS IMPLANT Bilateral     COLON RESECTION      CORONARY ARTERY BYPASS GRAFT  12/01/1996    LIMA to LAD and KARIE to RCA, performed by Dr Barney.    INGUINAL HERNIA REPAIR Bilateral     SINUS SURGERY      TONSILLECTOMY         Medications Prior to Admission   Medication Sig Dispense Refill Last Dose/Taking    amLODIPine (NORVASC) 5 MG tablet Take 1 tablet by mouth Daily.   2/4/2025 Morning    aspirin 81 MG tablet Take 1 tablet by mouth Daily.   2/4/2025 Morning    coenzyme Q10 100 MG capsule Take 2 capsules by mouth Daily.   2/4/2025 Morning    empagliflozin (JARDIANCE) 10 MG tablet tablet Take  by mouth. Take one tablet daily   2/4/2025 Morning    hydrochlorothiazide (HYDRODIURIL) 25 MG tablet Take 1 tablet by mouth 2 (Two) Times a Day.   2/4/2025 Morning    icosapent ethyl (VASCEPA) 1 g capsule capsule Take 2 g by mouth 2 (Two) Times a Day With Meals.   2/4/2025 Morning    lisinopril (PRINIVIL,ZESTRIL) 20 MG tablet Take 1 tablet by mouth 2 (Two) Times a Day. 180 tablet 3 2/4/2025 Morning    metFORMIN (GLUCOPHAGE) 1000 MG tablet Take 1 tablet by mouth 2 (Two) Times a Day With Meals.   2/4/2025 Morning    metoprolol succinate XL (TOPROL-XL) 25 MG 24 hr tablet Take 1 tablet by mouth Daily.    2/4/2025 Morning    pravastatin (PRAVACHOL) 20 MG tablet Take 1 tablet by mouth Daily.   2/4/2025 Morning    terazosin (HYTRIN) 2 MG capsule Take 1 capsule by mouth 2 (Two) Times a Day.   2/4/2025 Morning       Current Meds  Scheduled Meds:acetaminophen, 650 mg, Oral, BID  apixaban, 5 mg, Oral, Q12H  atorvastatin, 40 mg, Oral, Daily  [Held by provider] empagliflozin, 10 mg, Oral, Daily  insulin lispro, 2-9 Units, Subcutaneous, 4x Daily AC & at Bedtime  metoprolol succinate XL, 25 mg, Oral, Daily  oseltamivir, 30 mg, Oral, Q12H  phosphorus, 500 mg, Oral, BID  sodium chloride, 3 mL, Intravenous, Q12H      Continuous Infusions:Pharmacy to Dose Oseltamivir (TAMIFLU),       PRN Meds:.  acetaminophen **OR** acetaminophen **OR** acetaminophen    senna-docusate sodium **AND** polyethylene glycol **AND** bisacodyl **AND** bisacodyl    dextrose    dextrose    famotidine    glucagon (human recombinant)    labetalol    melatonin    ondansetron ODT **OR** ondansetron    Pharmacy to Dose Oseltamivir (TAMIFLU)    sodium chloride    sodium chloride    Allergies as of 02/04/2025 - Reviewed 02/04/2025   Allergen Reaction Noted    Contrast dye (echo or unknown ct/mr) Hives 02/19/2016    Phenergan [promethazine hcl] Nausea And Vomiting 09/10/2019    Hydralazine Dizziness 05/29/2020    Iodinated contrast media Hives 11/08/2012    Promethazine GI Intolerance 05/29/2020    Clonidine Rash 05/29/2020       Social History     Socioeconomic History    Marital status:    Tobacco Use    Smoking status: Never    Smokeless tobacco: Never   Vaping Use    Vaping status: Never Used   Substance and Sexual Activity    Alcohol use: Not Currently    Drug use: Never    Sexual activity: Defer       Family History   Problem Relation Age of Onset    Pancreatic cancer Mother     Breast cancer Mother     Cancer Other     Heart attack Other     Hypertension Other     Other Daughter         PALB2+       REVIEW OF SYSTEMS:   ROS was performed and is  "negative except as outlined in HPI     REVIEW OF SYSTEMS:   CONSTITUTIONAL: No weight loss, fever, chills, weakness or fatigue.   HEENT: Eyes: No visual loss, blurred vision, double vision or yellow sclerae. Ears, Nose, Throat: No hearing loss, sneezing, congestion, runny nose or sore throat.   SKIN: No rash or itching.     RESPIRATORY: No shortness of breath, hemoptysis, cough or sputum.   GASTROINTESTINAL: No anorexia, nausea, vomiting or diarrhea. No abdominal pain, bright red blood per rectum or melena.  GENITOURINARY: No burning on urination, hematuria or increased frequency.  NEUROLOGICAL: No headache, dizziness, syncope, paralysis, ataxia, numbness or tingling in the extremities. No change in bowel or bladder control.   MUSCULOSKELETAL: No muscle, back pain, joint pain or stiffness.   HEMATOLOGIC: No anemia, bleeding or bruising.   LYMPHATICS: No enlarged nodes. No history of splenectomy.   PSYCHIATRIC: No history of depression, anxiety, hallucinations.   ENDOCRINOLOGIC: No reports of sweating, cold or heat intolerance. No polyuria or polydipsia.        Objective:   Temp:  [98.1 °F (36.7 °C)-99.3 °F (37.4 °C)] 98.1 °F (36.7 °C)  Heart Rate:  [68-93] 68  Resp:  [16-18] 18  BP: (106-149)/(51-61) 131/61  Body mass index is 25.59 kg/m².  Flowsheet Rows      Flowsheet Row First Filed Value   Admission Height 182.9 cm (72\") Documented at 02/04/2025 2043   Admission Weight 82.6 kg (182 lb) Documented at 02/04/2025 2043          Vitals:    02/05/25 0633   BP: 131/61   Pulse: 68   Resp: 18   Temp: 98.1 °F (36.7 °C)   SpO2: 95%       Head:    Normocephalic, without obvious abnormality, atraumatic   Eyes:            Lids and lashes normal, conjunctivae and sclerae normal, no   icterus, no pallor   Ears:    Ears appear intact with no abnormalities noted   Throat:   No oral lesions, dentition good   Neck:   No adenopathy, supple, trachea midline, no thyromegaly, no   carotid bruit, no JVD   Lungs:     Breath sounds are " equal and clear to auscultation    Heart:    Normal S1 and S2, iRRR, No M/G/R   Abdomen:    Normal bowel sounds, no masses, no organomegaly, soft, nontender,       nondistended, no guarding   Extremities:   Moves all extremities well, no edema, no cyanosis, no redness   Pulses:   Pulses palpable and equal bilaterally.    Skin:  Psychiatric:   No bleeding, bruising or rash    Awake, alert and oriented x 3, normal mood and affect                   I personally viewed and interpreted the patient's EKG/Telemetry data    Assessment:  Active Hospital Problems    Diagnosis  POA    **Influenza A [J10.1]  Yes    Dehydration [E86.0]  Yes    LAUREANO (acute kidney injury) [N17.9]  Yes    Multiple falls [R29.6]  Not Applicable    Influenza A with respiratory manifestations [J10.1]  Yes    Stage 3a chronic kidney disease [N18.31]  Yes    Type 2 diabetes mellitus with hyperglycemia, without long-term current use of insulin [E11.65]  Yes    History of colon cancer [Z85.038]  Yes    Fatigue [R53.83]  Yes    History of coronary artery bypass surgery [Z95.1]  Not Applicable    Hypertension [I10]  Yes    Hyperlipidemia [E78.5]  Yes    Coronary artery disease [I25.10]  Yes      Resolved Hospital Problems   No resolved problems to display.       Plan: He is in new A-fib we do not know the duration that this is happened about it since the last office visit for sure may be related to his influenza if that is the case it will probably be a good chance it may resolve his CHADS2 Vascor is elevated I am going to put him on full dose Eliquis.  He has an elevated troponin and proBNP in the setting of a creatinine of 1.7 so and A-fib so I am not really concerned he does not appear to be volume overloaded and he is definitely not having an acute coronary syndrome.  His rate is controlled.  I am going to changes lipid therapy because he is not at goal and he was on just pravastatin so we will going to switch him over to atorvastatin    Frank Jim,  MD  02/05/25  13:21 EST.

## 2025-02-05 NOTE — ED PROVIDER NOTES
MD ATTESTATION NOTE    SHARED VISIT: This visit was performed by BOTH a physician and an APC. The substantive portion of the medical decision making was performed by this attesting physician who made or approved the management plan and takes responsibility for patient management. All studies in the APC note (if performed) were independently interpreted by me.     The AIDA and I have discussed this patient's history, physical exam, and treatment plan.  I have reviewed the documentation and affirm the documentation and agree with the treatment and plan.  The attached note describes my personal findings.      Independent Historians: Patient and family    A complete HPI/ROS/PMH/PSH/SH/FH are unobtainable due to: None    Chronic or social conditions impacting patient care (social determinants of health): None    Carlton Núñez is a 79 y.o. male history of CAD status post CABG, A-fib, hyperlipidemia who presents to the ED c/o acute illness since yesterday with productive cough, shortness of breath, malaise and bodyaches.  Patient feels very weak and has had several falls at home.  Patient denies any injury from the falls.  Family members tested positive for influenza          On exam:  GENERAL: Cooperative and conversant but disheveled and ill-appearing male of advanced age, alert, no acute distress  SKIN: Warm, dry  HENT: Normocephalic, atraumatic  EYES: no scleral icterus  CV: regular rhythm, regular rate  RESPIRATORY: normal effort, lungs clear, no wheezing  ABDOMEN: soft, nontender, nondistended  MUSCULOSKELETAL: no deformity  NEURO: alert, moves all extremities, follows commands                                                             Labs  Recent Results (from the past 24 hours)   Respiratory Panel PCR w/COVID-19(SARS-CoV-2) VICENTE/EMMA/KENNEDI/PAD/COR/MEMO In-House, NP Swab in UTM/VTM, 2 HR TAT - Swab, Nasopharynx    Collection Time: 02/04/25  5:52 PM    Specimen: Nasopharynx; Swab   Result Value Ref Range     ADENOVIRUS, PCR Not Detected Not Detected    Coronavirus 229E Not Detected Not Detected    Coronavirus HKU1 Not Detected Not Detected    Coronavirus NL63 Not Detected Not Detected    Coronavirus OC43 Not Detected Not Detected    COVID19 Not Detected Not Detected - Ref. Range    Human Metapneumovirus Not Detected Not Detected    Human Rhinovirus/Enterovirus Not Detected Not Detected    Influenza A H1 2009 PCR Detected (A) Not Detected    Influenza B PCR Not Detected Not Detected    Parainfluenza Virus 1 Not Detected Not Detected    Parainfluenza Virus 2 Not Detected Not Detected    Parainfluenza Virus 3 Not Detected Not Detected    Parainfluenza Virus 4 Not Detected Not Detected    RSV, PCR Not Detected Not Detected    Bordetella pertussis pcr Not Detected Not Detected    Bordetella parapertussis PCR Not Detected Not Detected    Chlamydophila pneumoniae PCR Not Detected Not Detected    Mycoplasma pneumo by PCR Not Detected Not Detected   Comprehensive Metabolic Panel    Collection Time: 02/04/25  6:11 PM    Specimen: Blood   Result Value Ref Range    Glucose 151 (H) 65 - 99 mg/dL    BUN 27 (H) 8 - 23 mg/dL    Creatinine 1.71 (H) 0.76 - 1.27 mg/dL    Sodium 137 136 - 145 mmol/L    Potassium 3.9 3.5 - 5.2 mmol/L    Chloride 101 98 - 107 mmol/L    CO2 24.2 22.0 - 29.0 mmol/L    Calcium 9.2 8.6 - 10.5 mg/dL    Total Protein 6.2 6.0 - 8.5 g/dL    Albumin 3.4 (L) 3.5 - 5.2 g/dL    ALT (SGPT) 21 1 - 41 U/L    AST (SGOT) 29 1 - 40 U/L    Alkaline Phosphatase 69 39 - 117 U/L    Total Bilirubin 0.7 0.0 - 1.2 mg/dL    Globulin 2.8 gm/dL    A/G Ratio 1.2 g/dL    BUN/Creatinine Ratio 15.8 7.0 - 25.0    Anion Gap 11.8 5.0 - 15.0 mmol/L    eGFR 39.7 (L) >60.0 mL/min/1.73   CBC Auto Differential    Collection Time: 02/04/25  6:11 PM    Specimen: Blood   Result Value Ref Range    WBC 7.38 3.40 - 10.80 10*3/mm3    RBC 4.39 4.14 - 5.80 10*6/mm3    Hemoglobin 13.0 13.0 - 17.7 g/dL    Hematocrit 39.1 37.5 - 51.0 %    MCV 89.1 79.0 - 97.0  fL    MCH 29.6 26.6 - 33.0 pg    MCHC 33.2 31.5 - 35.7 g/dL    RDW 14.4 12.3 - 15.4 %    RDW-SD 45.9 37.0 - 54.0 fl    MPV 8.2 6.0 - 12.0 fL    Platelets 132 (L) 140 - 450 10*3/mm3    Neutrophil % 90.9 (H) 42.7 - 76.0 %    Lymphocyte % 3.3 (L) 19.6 - 45.3 %    Monocyte % 5.0 5.0 - 12.0 %    Eosinophil % 0.3 0.3 - 6.2 %    Basophil % 0.1 0.0 - 1.5 %    Immature Grans % 0.4 0.0 - 0.5 %    Neutrophils, Absolute 6.71 1.70 - 7.00 10*3/mm3    Lymphocytes, Absolute 0.24 (L) 0.70 - 3.10 10*3/mm3    Monocytes, Absolute 0.37 0.10 - 0.90 10*3/mm3    Eosinophils, Absolute 0.02 0.00 - 0.40 10*3/mm3    Basophils, Absolute 0.01 0.00 - 0.20 10*3/mm3    Immature Grans, Absolute 0.03 0.00 - 0.05 10*3/mm3    nRBC 0.0 0.0 - 0.2 /100 WBC   BNP    Collection Time: 02/04/25  6:11 PM    Specimen: Blood   Result Value Ref Range    proBNP 1,609.0 0.0 - 1,800.0 pg/mL   High Sensitivity Troponin T    Collection Time: 02/04/25  6:11 PM    Specimen: Blood   Result Value Ref Range    HS Troponin T 79 (C) <22 ng/L   ECG 12 Lead Dyspnea    Collection Time: 02/04/25  7:25 PM   Result Value Ref Range    QT Interval 374 ms    QTC Interval 432 ms       Radiology  XR Chest 1 View    Result Date: 2/4/2025  XR CHEST 1 VW-  HISTORY: Male who is 79 years-old, cough  TECHNIQUE: Frontal view of the chest  COMPARISON: None available  FINDINGS: Heart, mediastinum and pulmonary vasculature are unremarkable. Sternotomy wires are present. Small left basilar atelectasis or infiltrate, follow-up suggested. No pleural effusion, or pneumothorax. No acute osseous process.      As described.  This report was finalized on 2/4/2025 6:22 PM by Dr. Eligio Gamble M.D on Workstation: [email or web address]       Medical Decision Making:  ED Course as of 02/04/25 2036 Tue Feb 04, 2025 1822 WBC: 7.38 [DC]   1822 Hemoglobin: 13.0 [DC]   1937 EKG ER MD interpretation   Time: 19: 25  Rhythm and rate: Atrial fibrillation at a rate of 80  Axis: Normal  QRS complexes: Normal  ST segments:  no elevation nor depressions  T waves: no flattening or inversions  No comparison EKG available    On record review patient last seen by cardiology October 2023.  Patient seen by Dr. Jim primarily with history of bypass surgery two-vessel in the 1990s and last cardiac cath in 2020 with some moderate aortic insufficiency.  Looking back even further in the record patient noted to have A-fib in 2012 and used to be on anticoagulation.  No other records however are available at this time [AR]   1950 Influenza A H1 2009 PCR(!): Detected [DC]   1952 proBNP: 1,609.0 [DC]   1952 Creatinine(!): 1.71 [DC]   1952 Sodium: 137 [DC]   1952 Potassium: 3.9 [DC]   2006 HS Troponin T(!!): 79 [DC]   2031 Discussed case with Dr. Rick, [facility], who will admit the patient. [DC]      ED Course User Index  [AR] Joana Renteria MD  [DC] Jessa Cohen PA       The differential diagnosis for generalized weakness or even near syncope/lightheadedness is quite broad and includes but is not limited to: hypoglycemia, orthostasis, arrhythmia, ACS, PE, electrolyte disturbances, dka, renal failure, profound anemia, aortic dissection, severe aortic stenosis, gi bleeding, intoxication, myasthenia gravis, sepsis, and medication effects--among other possibilities.  This patient presents with illness with fever and productive cough concerning for flu given patient's sick contacts.    Procedures:  Procedures        PPE: I followed hospital protocols for proper PPE based on patient presentation including use of N95 mask for suspected infectious respiratory conditions.  Proper hand hygiene was performed both before and after the patient encounter.          Diagnosis  Final diagnoses:   Influenza A   Acute renal insufficiency   Elevated troponin   Generalized weakness       Note Disclaimer: At [facility], we believe that sharing information builds trust and better relationships. You are receiving this note because you recently visited [facility]. It is possible you will see health information before a provider has talked with you about it. This kind of information can be easy to misunderstand. To help you fully understand what it means for your health, we urge you to discuss this note with your provider.       Joana Renteria MD  02/04/25 [id number]

## 2025-02-05 NOTE — PLAN OF CARE
Goal Outcome Evaluation:  Plan of Care Reviewed With: patient        Progress: no change  Outcome Evaluation: Patient is 79 year old male presenting to [facility] on 2/4/2025 with reports of 3-4 day history of cough, fevers, and chills with generalized weakness. Patient PMH significant for CAD, DM, HTN, LUCY. At baseline, patient reports he is independent with ADLs and does not use a device for functional mobility. Today, patient is SBA with bed mobility, SBA with transfers without use of AD. Patient is able to complete grooming in stance at sinkside and toileting task requiring no more than SBA without use of AD. Patient presents at or near baseline functional status at time of evaluation with no identified functional deficits that impede patient independence with activities of daily living.  No  indicated need for skilled occupational therapy intervention in the acute care setting.  Occupational therapy will sign off at this time.    Anticipated Discharge Disposition (OT): home

## 2025-02-05 NOTE — PROGRESS NOTES
[facility] Clinical Pharmacy Services: Renal Dose Adjustment    Lovenox has been appropriately renally dose adjusted based on our System P&T approved policy. Pharmacy will continue to monitor patient renal function while in-house.     Lazarus Kimble III, [facility]  Clinical Pharmacist

## 2025-02-05 NOTE — PLAN OF CARE
Goal Outcome Evaluation:  Plan of Care Reviewed With: patient, spouse           Outcome Evaluation: Carlton Núñez is an 79 year old male seen for physical therapy evaluation after being admitted with influenza and falls at home. He notes that he lives in a multilevel home with 3STE w/ railings. He lives with his spouse, he is indep at baseline, and does not use an AD. He reports x1 fall in the bathroom which he attributes to weakness from having a persistent fever and flu like symptoms. EMS was required to assist pt. off of the floor. Today, he performs BM, STS, and gait with standby A. He ambulates 15 ftx2 with no AD. No LOB noted. Pt. performed standing marches with increased time spent in single leg stance to simulate stairs. He performed this 3-4 times without UE support and no LOB. Pt. denies any further need for skilled PT in the acute setting. PT recommending home with A. Will sign off, please reconsult if mobility needs change.    Anticipated Discharge Disposition (PT): home with assist

## 2025-02-05 NOTE — CASE MANAGEMENT/SOCIAL WORK
Discharge Planning Assessment  [facility]     Patient Name: Carlton Núñez  MRN: [id number]  Today's Date: 2/5/2025    Admit Date: 2/4/2025    Plan: Home with family   Discharge Needs Assessment       Row Name 02/05/25 1315       Living Environment    People in Home spouse    Current Living Arrangements home    Potentially Unsafe Housing Conditions none    Primary Care Provided by self    Family Caregiver if Needed spouse    Quality of Family Relationships helpful;involved    Able to Return to Prior Arrangements yes       Resource/Environmental Concerns    Resource/Environmental Concerns none    Transportation Concerns none       Transition Planning    Patient/Family Anticipates Transition to home with family    Patient/Family Anticipated Services at Transition none    Transportation Anticipated family or friend will provide       Discharge Needs Assessment    Readmission Within the Last 30 Days no previous admission in last 30 days    Equipment Currently Used at Home none    Concerns to be Addressed no discharge needs identified    Anticipated Changes Related to Illness none    Equipment Needed After Discharge none                   Discharge Plan       Row Name 02/05/25 1316       Plan    Plan Home with family    Patient/Family in Agreement with Plan yes    Plan Comments Spoke to pt at bedside, introduced self and explained CCP role, verified face sheet and pharmacy information. Pt lives with wife Roselia in 2 level home with 2 KAIDEN and 2nd level bedroom. He is IADL's, uses no medical equipment, has no HH or SNF history. He plans home with wife to transport, denies dc needs.  CCP will follow - Maria Teresa PEREZ                  Continued Care and Services - Admitted Since 2/4/2025    No active coordination exists for this encounter.       Expected Discharge Date and Time       Expected Discharge Date Expected Discharge Time    Feb 7, 2025            Demographic Summary       Row Name 02/05/25 1311       General  Information    Admission Type observation                   Functional Status       Row Name 02/05/25 1311       Functional Status    Usual Activity Tolerance excellent    Current Activity Tolerance excellent       Assessment of Health Literacy    Health Literacy Good       Functional Status, IADL    Medications independent    Meal Preparation independent    Housekeeping independent    Laundry independent    Shopping independent       Mental Status    General Appearance WDL WDL       Mental Status Summary    Recent Changes in Mental Status/Cognitive Functioning no changes                   Psychosocial    No documentation.                  Abuse/Neglect    No documentation.                  Legal       Row Name 02/05/25 1315       Financial/Legal    Who Manages Finances if Patient Unable wife                   Substance Abuse    No documentation.                  Patient Forms    No documentation.                     Maria Teresa Rosales RN

## 2025-02-05 NOTE — PLAN OF CARE
Problem: Adult Inpatient Plan of Care  Goal: Readiness for Transition of Care  Intervention: Mutually Develop Transition Plan  Recent Flowsheet Documentation  Taken 2/4/2025 2251 by Frances Levin, RN  Transportation Anticipated: family or friend will provide  Patient/Family Anticipated Services at Transition: none  Patient/Family Anticipates Transition to: home  Taken 2/4/2025 2244 by Frances Levin, RN  Equipment Currently Used at Home: none   Goal Outcome Evaluation:pt was admitted 2/4/25 with flu A. Pt Aox4. Pt on RA, pt has gen weakness x1 asssit OOB. Bed alarm on. Pt does not use the call light to call for help. PVR on flow sheet.

## 2025-02-05 NOTE — THERAPY EVALUATION
Patient Name: Carlton Núñez  : 1945    MRN: [id number]                              Today's Date: 2025       Admit Date: 2025    Visit Dx:     ICD-10-CM ICD-9-CM   1. Influenza A  J10.1 487.1   2. Acute renal insufficiency  N28.9 593.9   3. Elevated troponin  R79.89 790.6   4. Generalized weakness  R53.1 780.79   5. Frequent falls  R29.6 V15.88   6. Atrial fibrillation, unspecified type  I48.91 427.31     Patient Active Problem List   Diagnosis    Hyperlipidemia    LUCY (obstructive sleep apnea)    Hypertension    Myocardial infarction    Coronary artery disease    Colon cancer    History of echocardiogram    History of nuclear stress test    History of cardiac cath    History of coronary artery bypass surgery    Moderate aortic insufficiency    Fatigue    Respiratory insufficiency    Palpitations    Abnormal nuclear stress test    Influenza A    Dehydration    LAUREANO (acute kidney injury)    Multiple falls    Influenza A with respiratory manifestations    Stage 3a chronic kidney disease    Type 2 diabetes mellitus with hyperglycemia, without long-term current use of insulin    History of colon cancer     Past Medical History:   Diagnosis Date    Allergy     Atrial fibrillation     Colon cancer     Coronary artery disease     Diabetes mellitus     Heart attack     High cholesterol     History of being hospitalized     Heart, colon cancer, hernia    History of cardiac cath     2016 - Two vessel CAD with patent LIMA/KARIE graftsto LAD and RCA. Culprit vessel likely diagonal with 95% ostial lesion, too small for intervension, best treated medically. LCX 60% proximal but no flow limiting. EF 55-60%.    History of echocardiogram     17 - Mild concentric LVH. EF 55-60%. Mildly dilated LA. Moderate AR. Mild to moderate MR. Mild TR ad OK    History of nuclear stress test     11/10/16 - Mild inferior wall hypokinesis. Inferoapical wall hypokinesis. The overall EF is 56%. Study suggestive of prior  inferior wall infarction with a mild to moderate area of ischemia remaining.    Hyperlipidemia     Hypertension     Irregular heart beat     Kidney stones     Myocardial infarction     LUCY (obstructive sleep apnea)      Past Surgical History:   Procedure Laterality Date    CARDIAC CATHETERIZATION N/A 6/23/2020    Procedure: Left Heart Cath;  Surgeon: Alton Coronado MD;  Location:  [facility];  Service: Cardiovascular;  Laterality: N/A;    CARDIAC CATHETERIZATION N/A 6/23/2020    Procedure: Coronary angiography;  Surgeon: Alton Coronado MD;  Location:  [facility];  Service: Cardiovascular;  Laterality: N/A;    CARDIAC CATHETERIZATION N/A 6/23/2020    Procedure: Left ventriculography;  Surgeon: Alton Coronado MD;  Location:  [facility];  Service: Cardiovascular;  Laterality: N/A;    CARDIAC CATHETERIZATION N/A 6/23/2020    Procedure: Native mammary injection;  Surgeon: Alton Coronado MD;  Location:  [facility];  Service: Cardiovascular;  Laterality: N/A;    CATARACT EXTRACTION WITH INTRAOCULAR LENS IMPLANT Bilateral     COLON RESECTION      CORONARY ARTERY BYPASS GRAFT  12/01/1996    LIMA to LAD and KARIE to RCA, performed by Dr Barney.    INGUINAL HERNIA REPAIR Bilateral     SINUS SURGERY      TONSILLECTOMY        General Information       Row Name 02/05/25 1451          Physical Therapy Time and Intention    Document Type evaluation  -ER     Mode of Treatment individual therapy;physical therapy  -ER       Row Name 02/05/25 1451          General Information    Patient Profile Reviewed yes  -ER     Prior Level of Function independent:  -ER     Existing Precautions/Restrictions fall  several falls at home secondary to weakness  -ER     Barriers to Rehab previous functional deficit  -ER       Row Name 02/05/25 1451          Living Environment    People in Home spouse  -ER       Row Name 02/05/25 1451          Home Main Entrance    Number of  Stairs, Main Entrance three  -ER     Stair Railings, Main Entrance railings safe and in good condition  -ER       Row Name 02/05/25 1451          Stairs Within Home, Primary    Number of Stairs, Within Home, Primary twelve  -ER       Row Name 02/05/25 1451          Cognition    Orientation Status (Cognition) oriented x 4  -ER       Row Name 02/05/25 1451          Safety Issues/Impairments Affecting Functional Mobility    Impairments Affecting Function (Mobility) shortness of breath;strength  -ER     Comment, Safety Issues/Impairments (Mobility) generalized weakness which pt. and spouse attribute to flu symptoms  -ER               User Key  (r) = Recorded By, (t) = Taken By, (c) = Cosigned By      Initials Name Provider Type    ER Brenda Taveras, PT Physical Therapist                   Mobility       Row Name 02/05/25 1452          Bed Mobility    Bed Mobility bed mobility (all) activities  -ER     All Activities, [place] (Bed Mobility) standby assist  -ER       Row Name 02/05/25 1452          Sit-Stand Transfer    Sit-Stand [place] (Transfers) standby assist  -ER       Row Name 02/05/25 1452          Gait/Stairs (Locomotion)    [place] Level (Gait) standby assist  -ER     Patient was able to Ambulate yes  -ER     Distance in Feet (Gait) 30  15x2  -ER     Deviations/Abnormal Patterns (Gait) bilateral deviations;stride length decreased  -ER               User Key  (r) = Recorded By, (t) = Taken By, (c) = Cosigned By      Initials Name Provider Type    ER Brenda Taveras, PT Physical Therapist                   Obj/Interventions       Row Name 02/05/25 1453          Range of Motion Comprehensive    General Range of Motion no range of motion deficits identified  -ER       Row Name 02/05/25 1453          Strength Comprehensive (MMT)    Comment, General Manual Muscle Testing (MMT) Assessment Generalized weakness  -ER       Row Name 02/05/25 1453          Balance    Balance Assessment sitting static balance;sitting  dynamic balance;standing static balance;standing dynamic balance  -ER     Static Sitting Balance modified independence  -ER     Dynamic Sitting Balance modified independence  -ER     Position, Sitting Balance sitting edge of bed;unsupported  -ER     Static Standing Balance standby assist  -ER     Dynamic Standing Balance standby assist;supervision  -ER     Position/Device Used, Standing Balance other (see comments)  none  -ER     Balance Interventions sitting;standing;sit to stand;supported;static;dynamic  -ER     Comment, Balance Pt. performed standing marches with increased time spent in single leg stance to simulate stairs. He performed this 3-4 times without UE support and no LOB.  -ER       Row Name 02/05/25 1453          Sensory Assessment (Somatosensory)    Sensory Assessment (Somatosensory) sensation intact  -ER               User Key  (r) = Recorded By, (t) = Taken By, (c) = Cosigned By      Initials Name Provider Type    ER Brenda Taveras, PT Physical Therapist                   Goals/Plan    No documentation.                  Clinical Impression       Row Name 02/05/25 1454          Pain    Pretreatment Pain Rating 0/10 - no pain  -ER     Posttreatment Pain Rating 0/10 - no pain  -ER       Row Name 02/05/25 1454          Plan of Care Review    Plan of Care Reviewed With patient;spouse  -ER     Outcome Evaluation Carlton Núñez is an 79 year old male seen for physical therapy evaluation after being admitted with influenza and falls at home. He notes that he lives in a multilevel home with 3STE w/ railings. He lives with his spouse, he is indep at baseline, and does not use an AD. He reports x1 fall in the bathroom which he attributes to weakness from having a persistent fever and flu like symptoms. EMS was required to assist pt. off of the floor. Today, he performs BM, STS, and gait with standby A. He ambulates 15 ftx2 with no AD. No LOB noted. Pt. performed standing marches with increased time spent in  single leg stance to simulate stairs. He performed this 3-4 times without UE support and no LOB. Pt. denies any further need for skilled PT in the acute setting. PT recommending home with A. Will sign off, please reconsult if mobility needs change.  -ER       Row Name 02/05/25 1454          Therapy Assessment/Plan (PT)    Criteria for Skilled Interventions Met (PT) no;no problems identified which require skilled intervention  -ER     Therapy Frequency (PT) evaluation only  -ER       Row Name 02/05/25 1451          Positioning and Restraints    Pre-Treatment Position in bed  -ER     Post Treatment Position bed  -ER     In Bed notified nsg;call light within reach;encouraged to call for assist;with family/caregiver  -ER               User Key  (r) = Recorded By, (t) = Taken By, (c) = Cosigned By      Initials Name Provider Type    ER Brenda Taveras PT Physical Therapist                   Outcome Measures       Row Name 02/05/25 1459          How much help from another person do you currently need...    Turning from your back to your side while in flat bed without using bedrails? 4  -ER     Moving from lying on back to sitting on the side of a flat bed without bedrails? 4  -ER     Moving to and from a bed to a chair (including a wheelchair)? 3  -ER     Standing up from a chair using your arms (e.g., wheelchair, bedside chair)? 3  -ER     Climbing 3-5 steps with a railing? 3  -ER     To walk in hospital room? 3  -ER     AM-PAC 6 Clicks Score (PT) 20  -ER     Highest Level of Mobility Goal 6 --> Walk 10 steps or more  -ER       Row Name 02/05/25 1452          Functional Assessment    Outcome Measure Options AM-PAC 6 Clicks Basic Mobility (PT)  -ER               User Key  (r) = Recorded By, (t) = Taken By, (c) = Cosigned By      Initials Name Provider Type    Brenda Vera PT Physical Therapist                                 Physical Therapy Education       Title: PT OT SLP Therapies (Done)       Topic: Physical Therapy  (Done)       Point: Mobility training (Done)       Learning Progress Summary            Patient Acceptance, E, VU by ER at 2/5/2025 1457                      Point: Home exercise program (Done)       Learning Progress Summary            Patient Acceptance, E, VU by ER at 2/5/2025 1457                      Point: Body mechanics (Done)       Learning Progress Summary            Patient Acceptance, E, VU by ER at 2/5/2025 1457                      Point: Precautions (Done)       Learning Progress Summary            Patient Acceptance, E, VU by ER at 2/5/2025 1457                                      User Key       Initials Effective Dates Name Provider Type Discipline    ER 10/15/23 -  Brenda Taveras, PT Physical Therapist PT                  PT Recommendation and Plan     Outcome Evaluation: Carlton Núñez is an 79 year old male seen for physical therapy evaluation after being admitted with influenza and falls at home. He notes that he lives in a multilevel home with 3STE w/ railings. He lives with his spouse, he is indep at baseline, and does not use an AD. He reports x1 fall in the bathroom which he attributes to weakness from having a persistent fever and flu like symptoms. EMS was required to assist pt. off of the floor. Today, he performs BM, STS, and gait with standby A. He ambulates 15 ftx2 with no AD. No LOB noted. Pt. performed standing marches with increased time spent in single leg stance to simulate stairs. He performed this 3-4 times without UE support and no LOB. Pt. denies any further need for skilled PT in the acute setting. PT recommending home with A. Will sign off, please reconsult if mobility needs change.     Time Calculation:         PT Charges       Row Name 02/05/25 1457             Time Calculation    Start Time 1328  -ER      Stop Time 1341  -ER      Time Calculation (min) 13 min  -ER      PT Received On 02/05/25  -ER      PT - Next Appointment 02/06/25  -ER      PT Goal Re-Cert Due Date  02/19/25  -ER         Time Calculation- PT    Total Timed Code Minutes- PT 10 minute(s)  -ER         Timed Charges    20544 - PT Therapeutic Activity Minutes 10  -ER         Total Minutes    Timed Charges Total Minutes 10  -ER       Total Minutes 10  -ER                User Key  (r) = Recorded By, (t) = Taken By, (c) = Cosigned By      Initials Name Provider Type    ER Brenda Taveras, PT Physical Therapist                  Therapy Charges for Today       Code Description Service Date Service Provider Modifiers Qty    18868321869 HC PT THERAPEUTIC ACT EA 15 MIN 2/5/2025 Brenda Taveras, PT GP 1    81373897936 HC PT EVAL MOD COMPLEXITY 3 2/5/2025 Brenda Taveras, PT GP 1            PT G-Codes  Outcome Measure Options: AM-PAC 6 Clicks Basic Mobility (PT)  AM-PAC 6 Clicks Score (PT): 20  PT Discharge Summary  Anticipated Discharge Disposition (PT): home with assist    Brenda Taveras, PT  2/5/2025

## 2025-02-05 NOTE — ED PROVIDER NOTES
EMERGENCY DEPARTMENT ENCOUNTER      PCP: Dylan Chan MD  Patient Care Team:  Dylan Chan MD as PCP - General  Cecile Mistry GC as Referring Physician (Genetic Counseling)   Independent Historians: Patient, spouse    HPI:  Chief Complaint: Flu symptoms   A complete HPI/ROS/PMH/PSH/SH/FH are unobtainable due to: None    Chronic or social conditions impacting patient care (social determinants of health): None    Context: Carlton Núñez is a 79 y.o. male who presents to the ED c/o acute cough, congestion, fevers, generalized weakness. Pt reports today he sat down on the bathroom floor and could not get up due to weakness. Pt denies any significant increase in SOA or having chest pain but does have cardiac hx including CABG. Family member was recently sick with influenza.    Review of prior external notes and/or external test results outside of this encounter: CMP on 6/18/2020 showed creatinine of 1.22.      PAST MEDICAL HISTORY  Active Ambulatory Problems     Diagnosis Date Noted    Hyperlipidemia     LUCY (obstructive sleep apnea)     Hypertension     Myocardial infarction     Coronary artery disease     Colon cancer     History of echocardiogram     History of nuclear stress test     History of cardiac cath     History of coronary artery bypass surgery 09/11/2019    Moderate aortic insufficiency 09/11/2019    Fatigue 03/11/2020    Respiratory insufficiency 03/11/2020    Palpitations 03/11/2020    Abnormal nuclear stress test 06/23/2020     Resolved Ambulatory Problems     Diagnosis Date Noted    Atrial fibrillation     Mitral valve insufficiency 09/11/2019    Anginal equivalent 06/04/2020     Past Medical History:   Diagnosis Date    Allergy     Diabetes mellitus     Heart attack     High cholesterol     History of being hospitalized     Irregular heart beat     Kidney stones        The patient has a COVID HM Topic on their chart, and they are fully vaccinated.    PAST SURGICAL HISTORY  Past Surgical  History:   Procedure Laterality Date    CARDIAC CATHETERIZATION N/A 6/23/2020    Procedure: Left Heart Cath;  Surgeon: Alton Coronado MD;  Location: [facility] CATH INVASIVE LOCATION;  Service: Cardiovascular;  Laterality: N/A;    CARDIAC CATHETERIZATION N/A 6/23/2020    Procedure: Coronary angiography;  Surgeon: Alton Coronado MD;  Location: [facility] C[facility]ASIVE LOC[facility];  Service: Cardiovascular;  Laterality: N/A;    CARDIAC CATHETERIZATION N/A 6/23/2020    Procedure: Left ventriculography;  Surgeon: Alton Coronado MD;  [facility];  Service: Cardiovascular;  Laterality: N/A;    CARDIAC CATHETERIZATION N/A 6/23/2020    Procedure: Native mammary injection;  Surgeon: Alton Coronado MD;  Location: [facility] CATH INVASIVE LOCATION;  Service: Cardiovascular;  Laterality: N/A;    CATARACT EXTRACTION WITH INTRAOCULAR LENS IMPLANT Bilateral     COLON RESECTION      CORONARY ARTERY BYPASS GRAFT  12/01/1996    LIMA to LAD and KARIE to RCA, performed by Dr Barney.    INGUINAL HERNIA REPAIR Bilateral     SINUS SURGERY      TONSILLECTOMY           FAMILY HISTORY  Family History   Problem Relation Age of Onset    Pancreatic cancer Mother     Breast cancer Mother     Cancer Other     Heart attack Other     Hypertension Other     Other Daughter         PALB2+         SOCIAL HISTORY  Social History     Socioeconomic History    Marital status:    Tobacco Use    Smoking status: Never    Smokeless tobacco: Never   Vaping Use    Vaping status: Never Used   Substance and Sexual Activity    Alcohol use: Not Currently    Drug use: Never    Sexual activity: Defer         ALLERGIES  Contrast dye (echo or unknown ct/mr), Phenergan [promethazine hcl], Hydralazine, Iodinated contrast media, Promethazine, and Clonidine        REVIEW OF SYSTEMS  Review of Systems   Constitutional:  Positive for fatigue.   HENT:  Positive for congestion.    Respiratory:  Positive for cough.    Cardiovascular:  Negative for  chest pain.   Gastrointestinal:  Negative for abdominal pain.   Neurological:  Positive for weakness.        All systems reviewed and negative except for those discussed in HPI.       PHYSICAL EXAM    I have reviewed the triage vital signs and nursing notes.    ED Triage Vitals [02/04/25 1712]   Temp Heart Rate Resp BP SpO2   99.3 °F (37.4 °C) 93 16 106/58 93 %      Temp src Heart Rate Source Patient Position BP Location FiO2 (%)   -- -- -- -- --       Physical Exam  GENERAL: Chronically ill-appearing elderly male, alert, no acute distress  SKIN: Warm, dry  HENT: Normocephalic, atraumatic  EYES: no scleral icterus  CV: regular rhythm, regular rate  RESPIRATORY: normal effort, lungs clear  ABDOMEN: soft, nontender, nondistended  MUSCULOSKELETAL: no deformity, no pitting edema  NEURO: alert, moves all extremities, follows commands          LAB RESULTS  Labs Reviewed   RESPIRATORY PANEL PCR W/ COVID-19 (SARS-COV-2), NP SWAB IN UTM/VTP, 2 HR TAT - Abnormal; Notable for the following components:       Result Value    Influenza A H1 2009 PCR Detected (*)     All other components within normal limits    Narrative:     In the setting of a positive respiratory panel with a viral infection PLUS a negative procalcitonin without other underlying concern for bacterial infection, consider observing off antibiotics or discontinuation of antibiotics and continue supportive care. If the respiratory panel is positive for atypical bacterial infection (Bordetella pertussis, Chlamydophila pneumoniae, or Mycoplasma pneumoniae), consider antibiotic de-escalation to target atypical bacterial infection.   COMPREHENSIVE METABOLIC PANEL - Abnormal; Notable for the following components:    Glucose 151 (*)     BUN 27 (*)     Creatinine 1.71 (*)     Albumin 3.4 (*)     eGFR 39.7 (*)     All other components within normal limits    Narrative:     GFR Categories in Chronic Kidney Disease (CKD)      GFR Category          GFR (mL/min/1.73)     Interpretation  G1                     90 or greater         Normal or high (1)  G2                      60-89                Mild decrease (1)  G3a                   45-59                Mild to moderate decrease  G3b                   30-44                Moderate to severe decrease  G4                    15-29                Severe decrease  G5                    14 or less           Kidney failure          (1)In the absence of evidence of kidney disease, neither GFR category G1 or G2 fulfill the criteria for CKD.    eGFR calculation 2021 CKD-EPI creatinine equation, which does not include race as a factor   CBC WITH AUTO DIFFERENTIAL - Abnormal; Notable for the following components:    Platelets 132 (*)     Neutrophil % 90.9 (*)     Lymphocyte % 3.3 (*)     Lymphocytes, Absolute 0.24 (*)     All other components within normal limits   TROPONIN - Abnormal; Notable for the following components:    HS Troponin T 79 (*)     All other components within normal limits    Narrative:     High Sensitive Troponin T Reference Range:  <14.0 ng/L- Negative Female for AMI  <22.0 ng/L- Negative Male for AMI  >=14 - Abnormal Female indicating possible myocardial injury.  >=22 - Abnormal Male indicating possible myocardial injury.   Clinicians would have to utilize clinical acumen, EKG, Troponin, and serial changes to determine if it is an Acute Myocardial Infarction or myocardial injury due to an underlying chronic condition.        HIGH SENSITIVITIY TROPONIN T 1HR - Abnormal; Notable for the following components:    HS Troponin T 88 (*)     All other components within normal limits    Narrative:     High Sensitive Troponin T Reference Range:  <14.0 ng/L- Negative Female for AMI  <22.0 ng/L- Negative Male for AMI  >=14 - Abnormal Female indicating possible myocardial injury.  >=22 - Abnormal Male indicating possible myocardial injury.   Clinicians would have to utilize clinical acumen, EKG, Troponin, and serial changes to  determine if it is an Acute Myocardial Infarction or myocardial injury due to an underlying chronic condition.        HEMOGLOBIN A1C - Abnormal; Notable for the following components:    Hemoglobin A1C 6.20 (*)     All other components within normal limits    Narrative:     Hemoglobin A1C Ranges:    Increased Risk for Diabetes  5.7% to 6.4%  Diabetes                     >= 6.5%  Diabetic Goal                < 7.0%   CBC (NO DIFF) - Abnormal; Notable for the following components:    Platelets 120 (*)     All other components within normal limits   PHOSPHORUS - Abnormal; Notable for the following components:    Phosphorus 2.4 (*)     All other components within normal limits   CBC (NO DIFF) - Abnormal; Notable for the following components:    WBC 2.66 (*)     Platelets 119 (*)     All other components within normal limits   BASIC METABOLIC PANEL - Abnormal; Notable for the following components:    Calcium 8.3 (*)     All other components within normal limits    Narrative:     GFR Categories in Chronic Kidney Disease (CKD)      GFR Category          GFR (mL/min/1.73)    Interpretation  G1                     90 or greater         Normal or high (1)  G2                      60-89                Mild decrease (1)  G3a                   45-59                Mild to moderate decrease  G3b                   30-44                Moderate to severe decrease  G4                    15-29                Severe decrease  G5                    14 or less           Kidney failure          (1)In the absence of evidence of kidney disease, neither GFR category G1 or G2 fulfill the criteria for CKD.    eGFR calculation 2021 CKD-EPI creatinine equation, which does not include race as a factor   LIPID PANEL - Abnormal; Notable for the following components:    HDL Cholesterol 38 (*)     All other components within normal limits    Narrative:     Cholesterol Reference Ranges  (U.S. Department of Health and Human Services ATP III  Classifications)    Desirable          <200 mg/dL  Borderline High    200-239 mg/dL  High Risk          >240 mg/dL      Triglyceride Reference Ranges  (U.S. Department of Health and Human Services ATP III Classifications)    Normal           <150 mg/dL  Borderline High  150-199 mg/dL  High             200-499 mg/dL  Very High        >500 mg/dL    HDL Reference Ranges  (U.S. Department of Health and Human Services ATP III Classifications)    Low     <40 mg/dl (major risk factor for CHD)  High    >60 mg/dl ('negative' risk factor for CHD)        LDL Reference Ranges  (U.S. Department of Health and Human Services ATP III Classifications)    Optimal          <100 mg/dL  Near Optimal     100-129 mg/dL  Borderline High  130-159 mg/dL  High             160-189 mg/dL  Very High        >189 mg/dL    LDL is calculated using the NIH LDL-C calculation.     BNP (IN-HOUSE) - Normal    Narrative:     This assay is used as an aid in the diagnosis of individuals suspected of having heart failure. It can be used as an aid in the diagnosis of acute decompensated heart failure (ADHF) in patients presenting with signs and symptoms of ADHF to the emergency department (ED). In addition, NT-proBNP of <300 pg/mL indicates ADHF is not likely.    Age Range Result Interpretation  NT-proBNP Concentration (pg/mL:      <50             Positive            >450                   Gray                 300-450                    Negative             <300    50-75           Positive            >900                  Gray                300-900                  Negative            <300      >75             Positive            >1800                  Gray                300-1800                  Negative            <300   BASIC METABOLIC PANEL - Normal    Narrative:     GFR Categories in Chronic Kidney Disease (CKD)      GFR Category          GFR (mL/min/1.73)    Interpretation  G1                     90 or greater         Normal or high (1)  G2                       60-89                Mild decrease (1)  G3a                   45-59                Mild to moderate decrease  G3b                   30-44                Moderate to severe decrease  G4                    15-29                Severe decrease  G5                    14 or less           Kidney failure          (1)In the absence of evidence of kidney disease, neither GFR category G1 or G2 fulfill the criteria for CKD.    eGFR calculation 2021 CKD-EPI creatinine equation, which does not include race as a factor   MAGNESIUM - Normal   MAGNESIUM - Normal   PHOSPHORUS - Normal   POCT GLUCOSE FINGERSTICK - Normal   POCT GLUCOSE FINGERSTICK - Normal   POCT GLUCOSE FINGERSTICK - Normal   POCT GLUCOSE FINGERSTICK - Normal   POCT GLUCOSE FINGERSTICK - Normal   POCT GLUCOSE FINGERSTICK - Normal   POCT GLUCOSE FINGERSTICK - Normal   POCT GLUCOSE FINGERSTICK   POCT GLUCOSE FINGERSTICK   POCT GLUCOSE FINGERSTICK   POCT GLUCOSE FINGERSTICK   POCT GLUCOSE FINGERSTICK   POCT GLUCOSE FINGERSTICK   POCT GLUCOSE FINGERSTICK   CBC AND DIFFERENTIAL    Narrative:     The following orders were created for panel order CBC & Differential.  Procedure                               Abnormality         Status                     ---------                               -----------         ------                     CBC Auto Differential[[id number]]        Abnormal            Final result                 Please view results for these tests on the individual orders.         Ordered the above labs and independently reviewed and interpreted the results.        RADIOLOGY  XR Chest 1 View   Final Result   FINDINGS: Heart, mediastinum and pulmonary vasculature are unremarkable.  Sternotomy wires are present. Small left basilar atelectasis or  infiltrate, follow-up suggested. No pleural effusion, or pneumothorax.  No acute osseous process.  As described.       This report was finalized on 2/4/2025 6:22 PM by Dr. Eligio Gamble M.D on  Workstation: [id number]              I ordered the above noted radiological studies. Independently reviewed and interpreted by me.  See dictation for official radiology interpretation.      PROCEDURES    Procedures      MEDICATIONS GIVEN IN ER    Medications   sodium chloride 0.9 % infusion (75 mL/hr Intravenous New Bag 2/4/25 2240)   sodium chloride 0.9 % bolus 1,000 mL (0 mL Intravenous Stopped 2/4/25 2115)   oseltamivir (TAMIFLU) capsule 75 mg (75 mg Oral Given 2/4/25 2055)   phosphorus (K PHOS NEUTRAL) tablet 2 tablet (2 tablets Oral Given 2/5/25 2054)         PROGRESS, DATA ANALYSIS, CONSULTS, AND MEDICAL DECISION MAKING    All labs have been independently reviewed and interpreted by me.  All radiology studies have been independently reviewed and interpreted by me and discussed with radiologist dictating the report.   EKG's independently reviewed and interpreted by me.  Discussion below represents my analysis of pertinent findings related to patient's condition, differential diagnosis, treatment plan and final disposition.    My differential diagnosis for generalized weakness includes but is not limited to:    Neuromuscular weakness, CVA, Hemorrhagic stroke, Multiple sclerosis, Spinal cord disease:Infection (Epidural abscess), Infarction/ischemia, Trauma (Spinal Cord Syndromes), Inflammation (Transverse Myelitis), Degenerative (Spinal muscular atrophy), Tumor, Peripheral nerve disease: Guillain-C[place] syndrome, Toxins (Ciguatera), Diabetic peripheral neuropathy, Organophosphate toxicity, Lambert-Eaton myasthenic syndrome, Rhabdomyolysis, Dermatomyositis, Polymyositis, Alcoholic myopathy, Non-neuromuscular weakness, ACS, Arrhythmia/Syncope, Severe infection/Sepsis, Hypoglycemia, Periodic paralysis (electrolyte disturbance, K, Mg, Ca), Thyrotoxic periodic paralysis, Respiratory failure, Symptomatic Anemia, Severe dehydration, Hypothyroidism, Polypharmacy, Malignancy          ED Course as of 02/07/25 1936 Tue Feb  04, 2025 1822 WBC: 7.38 [DC]   1822 Hemoglobin: 13.0 [DC]   1937 EKG ER MD interpretation   Time: 19: 25  Rhythm and rate: Atrial fibrillation at a rate of 80  Axis: Normal  QRS complexes: Normal  ST segments: no elevation nor depressions  T waves: no flattening or inversions  No comparison EKG available    On record review patient last seen by cardiology October 2023.  Patient seen by Dr. Jim primarily with history of bypass surgery two-vessel in the 1990s and last cardiac cath in 2020 with some moderate aortic insufficiency.  Looking back even further in the record patient noted to have A-fib in 2012 and used to be on anticoagulation.  No other records however are available at this time [AR]   1948 Influenza A H1 2009 PCR(!): Detected [DC]   1952 proBNP: 1,609.0 [DC]   1952 Creatinine(!): 1.71 [DC]   1952 Sodium: 137 [DC]   1952 Potassium: 3.9 [DC]   2006 HS Troponin T(!!): 79 [DC]   2031 Discussed case with Dr. Rick, [facility], who will admit the patient. [DC]      ED Course User Index  [AR] Joana Renteria MD  [DC] Jessa Cohen PA             AS OF 19:36 EST VITALS:    BP - 160/65  HR - 58  TEMP - 98.4 °F (36.9 °C) (Oral)  O2 SATS - 95%        DIAGNOSIS  Final diagnoses:   Influenza A   Acute renal insufficiency   Elevated troponin   Generalized weakness   Frequent falls   Atrial fibrillation, unspecified type         DISPOSITION  ED Disposition       ED Disposition   Decision to Admit    Condition   --    Comment   Level of Care: Telemetry [5]   Diagnosis: Influenza A [779300]   Admitting Physician: ELVIA RICK [[id number]]   Attending Physician: ELVIA RICK [[id number]]   Is patient appropriate for Inpatient Observation Unit?: No [0]                    Note Disclaimer: At [facility], we believe that sharing information builds trust and better relationships. You are receiving this note because you recently visited [facility]. It is possible you will see health information before a  provider has talked with you about it. This kind of information can be easy to misunderstand. To help you fully understand what it means for your health, we urge you to discuss this note with your provider.         Jessa Cohen PA  02/07/25 1937

## 2025-02-06 ENCOUNTER — READMISSION MANAGEMENT (OUTPATIENT)
Dept: CALL CENTER | Facility: HOSPITAL | Age: 80
End: 2025-02-06
Payer: COMMERCIAL

## 2025-02-06 VITALS
DIASTOLIC BLOOD PRESSURE: 65 MMHG | HEIGHT: 72 IN | WEIGHT: 188 LBS | SYSTOLIC BLOOD PRESSURE: 160 MMHG | OXYGEN SATURATION: 95 % | RESPIRATION RATE: 18 BRPM | BODY MASS INDEX: 25.47 KG/M2 | TEMPERATURE: 98.4 F | HEART RATE: 58 BPM

## 2025-02-06 LAB
ANION GAP SERPL CALCULATED.3IONS-SCNC: 8.7 MMOL/L (ref 5–15)
BUN SERPL-MCNC: 19 MG/DL (ref 8–23)
BUN/CREAT SERPL: 20 (ref 7–25)
CALCIUM SPEC-SCNC: 8.3 MG/DL (ref 8.6–10.5)
CHLORIDE SERPL-SCNC: 103 MMOL/L (ref 98–107)
CHOLEST SERPL-MCNC: 103 MG/DL (ref 0–200)
CO2 SERPL-SCNC: 25.3 MMOL/L (ref 22–29)
CREAT SERPL-MCNC: 0.95 MG/DL (ref 0.76–1.27)
DEPRECATED RDW RBC AUTO: 45.8 FL (ref 37–54)
EGFRCR SERPLBLD CKD-EPI 2021: 80.4 ML/MIN/1.73
ERYTHROCYTE [DISTWIDTH] IN BLOOD BY AUTOMATED COUNT: 14.4 % (ref 12.3–15.4)
GLUCOSE BLDC GLUCOMTR-MCNC: 103 MG/DL (ref 70–130)
GLUCOSE BLDC GLUCOMTR-MCNC: 87 MG/DL (ref 70–130)
GLUCOSE SERPL-MCNC: 94 MG/DL (ref 65–99)
HCT VFR BLD AUTO: 38.6 % (ref 37.5–51)
HDLC SERPL-MCNC: 38 MG/DL (ref 40–60)
HGB BLD-MCNC: 13.1 G/DL (ref 13–17.7)
LDLC SERPL CALC-MCNC: 48 MG/DL (ref 0–100)
LDLC/HDLC SERPL: 1.25 {RATIO}
MAGNESIUM SERPL-MCNC: 2.2 MG/DL (ref 1.6–2.4)
MCH RBC QN AUTO: 30.2 PG (ref 26.6–33)
MCHC RBC AUTO-ENTMCNC: 33.9 G/DL (ref 31.5–35.7)
MCV RBC AUTO: 88.9 FL (ref 79–97)
PHOSPHATE SERPL-MCNC: 2.5 MG/DL (ref 2.5–4.5)
PLATELET # BLD AUTO: 119 10*3/MM3 (ref 140–450)
PMV BLD AUTO: 8.2 FL (ref 6–12)
POTASSIUM SERPL-SCNC: 3.8 MMOL/L (ref 3.5–5.2)
RBC # BLD AUTO: 4.34 10*6/MM3 (ref 4.14–5.8)
SODIUM SERPL-SCNC: 137 MMOL/L (ref 136–145)
TRIGL SERPL-MCNC: 87 MG/DL (ref 0–150)
VLDLC SERPL-MCNC: 17 MG/DL (ref 5–40)
WBC NRBC COR # BLD AUTO: 2.66 10*3/MM3 (ref 3.4–10.8)

## 2025-02-06 PROCEDURE — 36415 COLL VENOUS BLD VENIPUNCTURE: CPT | Performed by: INTERNAL MEDICINE

## 2025-02-06 PROCEDURE — 80048 BASIC METABOLIC PNL TOTAL CA: CPT | Performed by: INTERNAL MEDICINE

## 2025-02-06 PROCEDURE — 80061 LIPID PANEL: CPT | Performed by: STUDENT IN AN ORGANIZED HEALTH CARE EDUCATION/TRAINING PROGRAM

## 2025-02-06 PROCEDURE — 82948 REAGENT STRIP/BLOOD GLUCOSE: CPT

## 2025-02-06 PROCEDURE — 99214 OFFICE O/P EST MOD 30 MIN: CPT | Performed by: INTERNAL MEDICINE

## 2025-02-06 PROCEDURE — 85027 COMPLETE CBC AUTOMATED: CPT | Performed by: INTERNAL MEDICINE

## 2025-02-06 PROCEDURE — 84100 ASSAY OF PHOSPHORUS: CPT | Performed by: STUDENT IN AN ORGANIZED HEALTH CARE EDUCATION/TRAINING PROGRAM

## 2025-02-06 PROCEDURE — G0378 HOSPITAL OBSERVATION PER HR: HCPCS

## 2025-02-06 PROCEDURE — 83735 ASSAY OF MAGNESIUM: CPT | Performed by: STUDENT IN AN ORGANIZED HEALTH CARE EDUCATION/TRAINING PROGRAM

## 2025-02-06 RX ORDER — LISINOPRIL 20 MG/1
20 TABLET ORAL DAILY
Start: 2025-02-06

## 2025-02-06 RX ORDER — AMLODIPINE BESYLATE 5 MG/1
5 TABLET ORAL DAILY
Status: DISCONTINUED | OUTPATIENT
Start: 2025-02-06 | End: 2025-02-06 | Stop reason: HOSPADM

## 2025-02-06 RX ORDER — OSELTAMIVIR PHOSPHATE 30 MG/1
30 CAPSULE ORAL EVERY 12 HOURS SCHEDULED
Qty: 6 CAPSULE | Refills: 0 | Status: SHIPPED | OUTPATIENT
Start: 2025-02-06 | End: 2025-02-09

## 2025-02-06 RX ORDER — LISINOPRIL 20 MG/1
20 TABLET ORAL DAILY
Status: DISCONTINUED | OUTPATIENT
Start: 2025-02-06 | End: 2025-02-06 | Stop reason: HOSPADM

## 2025-02-06 RX ORDER — ATORVASTATIN CALCIUM 40 MG/1
40 TABLET, FILM COATED ORAL DAILY
Qty: 30 TABLET | Refills: 0 | Status: SHIPPED | OUTPATIENT
Start: 2025-02-07 | End: 2025-03-09

## 2025-02-06 RX ADMIN — OSELTAMIVIR PHOSPHATE 30 MG: 30 CAPSULE ORAL at 08:42

## 2025-02-06 RX ADMIN — APIXABAN 5 MG: 5 TABLET, FILM COATED ORAL at 08:42

## 2025-02-06 RX ADMIN — Medication 3 ML: at 08:43

## 2025-02-06 RX ADMIN — METOPROLOL SUCCINATE 25 MG: 25 TABLET, EXTENDED RELEASE ORAL at 08:42

## 2025-02-06 RX ADMIN — LISINOPRIL 20 MG: 20 TABLET ORAL at 12:57

## 2025-02-06 RX ADMIN — ACETAMINOPHEN 650 MG: 325 TABLET, FILM COATED ORAL at 08:43

## 2025-02-06 RX ADMIN — GUAIFENESIN 600 MG: 600 TABLET, MULTILAYER, EXTENDED RELEASE ORAL at 08:43

## 2025-02-06 RX ADMIN — AMLODIPINE BESYLATE 5 MG: 5 TABLET ORAL at 12:57

## 2025-02-06 RX ADMIN — ATORVASTATIN CALCIUM 40 MG: 20 TABLET, FILM COATED ORAL at 08:43

## 2025-02-06 NOTE — CASE MANAGEMENT/SOCIAL WORK
Case Management Discharge Note      Final Note: home no needs         Selected Continued Care - Admitted Since 2/4/2025       Destination    No services have been selected for the patient.                Durable Medical Equipment    No services have been selected for the patient.                Dialysis/Infusion    No services have been selected for the patient.                Home Medical Care    No services have been selected for the patient.                Therapy    No services have been selected for the patient.                Community Resources    No services have been selected for the patient.                Community & DME    No services have been selected for the patient.                    Transportation Services  Private: Car    Final Discharge Disposition Code: 01 - home or self-care

## 2025-02-06 NOTE — DISCHARGE SUMMARY
Patient Name: Carlton Núñez  : 1945  MRN: [id number]    Date of Admission: 2025  Date of Discharge:  2025  Primary Care Physician: Dylan Chan MD      Chief Complaint:   Flu Symptoms      Discharge Diagnoses     Active Hospital Problems    Diagnosis  POA    **Influenza A [J10.1]  Yes    Dehydration [E86.0]  Yes    LAUREANO (acute kidney injury) [N17.9]  Yes    Multiple falls [R29.6]  Not Applicable    Influenza A with respiratory manifestations [J10.1]  Yes    Stage 3a chronic kidney disease [N18.31]  Yes    Type 2 diabetes mellitus with hyperglycemia, without long-term current use of insulin [E11.65]  Yes    History of colon cancer [Z85.038]  Yes    Fatigue [R53.83]  Yes    History of coronary artery bypass surgery [Z95.1]  Not Applicable    Hypertension [I10]  Yes    Hyperlipidemia [E78.5]  Yes    Coronary artery disease [I25.10]  Yes      Resolved Hospital Problems   No resolved problems to display.        Hospital Course     Carlton Núñez is a 79 y.o. male with past medical history as listed presents to the hospital with 3 to 4-day history of cough with fevers and chills and moderate persistent generalized weakness.  Diagnosed with influenza A and new onset A-fib.    Influenza infection:  Respiratory panel was positive for influenza A-eart. X- ray showed Small left basilar atelectasis or infiltrate, follow-up suggested. No pleural effusion, or pneumothorax.  No acute osseous process.  Was initiated on Tamiflu, Mucinex, supportive care.  Symptoms improved, remained on room air, reports she was feeling much better and weakness improved.  Continued Tamiflu at discharge to complete 5-day course of antivirals.      New onset atrial fibrillation:  Mild aortic root dilation  Mild to moderate mitral valve regurg,   mild to moderate aortic valve regurg  History of CAD status post CABG  was initiated on Eliquis, outpatient metoprolol was continued, cardiology was consulted.  Echocardiogram was  obtained left ventricular systolic function isnormal. Left ventricular ejection fraction appears to be 56 - 60%. There is mild to moderate asymmetric basal septal hypertrophy without LVOT obstruction Mild to moderate aortic valve regurgitation is present.  Mild to moderate mitral valve regurgitation is present.Mild dilation of the aortic root is present. Aortic root = 4.0 cm  -Patient was discharged on Eliquis for A-fib, outpatient metoprolol was continued.  Will need to follow-up with cardiology as scheduled.  Surveillance imaging to monitor aortic root dilation per cardiology  -Pravastatin was discontinued, initiated on atorvastatin per cardiology inpatient, continued at discharge.     Dehydration and LAUREANO with CKD 3A:  Creatinine on admission is 1.71.  Previous records reviewed and creatinine 1.22 in June 2020  Status post IV fluids.  Creatinine improved back to baseline.    Recommend repeat BMP in 1 week to monitor     Type 2 diabetes:  Jardiance and metformin was held due to LAUREANO dehydration on admission.  Resumed at discharge.     Essential hypertension:   Metoprolol was continued, outpatient lisinopril/chlorthalidone/were held initially due to LAUREANO.  Blood pressure was trending back up prior to discharge.  Metoprolol continued for A-fib as above.  Amlodipine was resumed.  Patient is on lisinopril 20 mg twice daily and HCTZ 25 mg twice daily.  Resumed lisinopril at 20 mg daily, continue to hold HCTZ at discharge.  Placed instructions to monitor blood pressure twice daily, and resume HCTZ 25 mg daily if blood pressure persistently elevated above 140 systolic.  Follow-up with the primary care provider with blood pressure log within a week.      At the time of discharge patient was told to take all medications as prescribed, keep all follow-up appointments, and call their doctor or return to the hospital with any worsening or concerning symptoms.             Day of Discharge     Subjective:  Patient seen this  morning.  Reports he is feeling better, weakness significantly improved.  Denies chest pain or palpitations.  No shortness of breath, very mild cough.  Wants to be discharged home.    Physical Exam:  Temp:  [97.3 °F (36.3 °C)-99 °F (37.2 °C)] 98.4 °F (36.9 °C)  Heart Rate:  [58-71] 58  Resp:  [18] 18  BP: (143-175)/(58-67) 160/65  Body mass index is 25.5 kg/m².  Physical Exam    General: Alert and oriented x3, no acute distress  HEENT: Normocephalic, atraumatic  CV: Irregular rhythm, normal rate  Lungs: Clear to auscultation bilaterally, no crackles or wheezes  Abdomen: Soft, nontender, nondistended  Extremities: No significant peripheral edema , no cyanosis     Consultants     Consult Orders (all) (From admission, onward)       Start     Ordered    02/05/25 0554  Inpatient Cardiology Consult  Once        Comments: Spoke with Shayla (answering services).   Specialty:  Cardiology  Provider:  Pritesh Hammer MD    02/05/25 0602 02/04/25 2323  Inpatient Cardiology Consult  Once,   Status:  Canceled        Specialty:  Cardiology  Provider:  (Not yet assigned)    02/04/25 2322 02/04/25 2020  [facility] (on-call MD unless specified) Details  Once        Specialty:  Hospitalist  Provider:  (Not yet assigned)    02/04/25 2019                  Procedures     * Surgery not found *      Imaging Results (All)       Procedure Component Value Units Date/Time    XR Chest 1 View [[id number]] Collected: 02/04/25 1821     Updated: 02/04/25 1825    Narrative:      XR CHEST 1 VW-     HISTORY: Male who is 79 years-old, cough     TECHNIQUE: Frontal view of the chest     COMPARISON: None available     FINDINGS: Heart, mediastinum and pulmonary vasculature are unremarkable.  Sternotomy wires are present. Small left basilar atelectasis or  infiltrate, follow-up suggested. No pleural effusion, or pneumothorax.  No acute osseous process.       Impression:      As described.     This report was finalized on 2/4/2025 6:22 PM by Dr. Eligio COHN  AUDREY Gamble on Workstation: [id number]               Results for orders placed during the hospital encounter of 02/04/25    Adult Transthoracic Echo Complete W/ Cont if Necessary Per Protocol    Interpretation Summary    Left ventricular systolic function is normal. Left ventricular ejection fraction appears to be 56 - 60%.    There is mild to moderate asymmetric basal septal hypertrophy without LVOT obstruction    Left ventricular diastolic function was normal.    Normal right ventricular cavity size and systolic function noted.    : The left atrial cavity is mild to moderately dilated.    The aortic valve leaflets are moderately calcified (aortic sclerosis)    Mild to moderate aortic valve regurgitation is present    Mild to moderate mitral valve regurgitation is present.    Calculated right ventricular systolic pressure from tricuspid regurgitation is 21 mmHg.    Mild dilation of the aortic root is present. Aortic root = 4.0 cm    There is no evidence of pericardial effusion.    Pertinent Labs     Results from last 7 days   Lab Units 02/06/25  0731 02/05/25  0649 02/04/25  1811   WBC 10*3/mm3 2.66* 4.28 7.38   HEMOGLOBIN g/dL 13.1 13.0 13.0   PLATELETS 10*3/mm3 119* 120* 132*     Results from last 7 days   Lab Units 02/06/25  0731 02/05/25  0649 02/04/25  1811   SODIUM mmol/L 137 137 137   POTASSIUM mmol/L 3.8 3.5 3.9   CHLORIDE mmol/L 103 101 101   CO2 mmol/L 25.3 24.1 24.2   BUN mg/dL 19 23 27*   CREATININE mg/dL 0.95 1.21 1.71*   GLUCOSE mg/dL 94 93 151*   Estimated Creatinine Clearance: 73.6 mL/min (by C-G formula based on SCr of 0.95 mg/dL).  Results from last 7 days   Lab Units 02/04/25  1811   ALBUMIN g/dL 3.4*   BILIRUBIN mg/dL 0.7   ALK PHOS U/L 69   AST (SGOT) U/L 29   ALT (SGPT) U/L 21     Results from last 7 days   Lab Units 02/06/25  0731 02/05/25  0649 02/04/25  1811   CALCIUM mg/dL 8.3* 8.6 9.2   ALBUMIN g/dL  --   --  3.4*   MAGNESIUM mg/dL 2.2 2.0  --    PHOSPHORUS mg/dL 2.5 2.4*  --         Results from last 7 days   Lab Units 02/04/25 2028 02/04/25  1811   HSTROP T ng/L 88* 79*   PROBNP pg/mL  --  1,609.0       Results from last 7 days   Lab Units 02/06/25  0731   CHOLESTEROL mg/dL 103   TRIGLYCERIDES mg/dL 87   HDL CHOL mg/dL 38*   LDL CHOL mg/dL 48         Results from last 7 days   Lab Units 02/04/25  1752   COVID19  Not Detected       Test Results Pending at Discharge       Discharge Details        Discharge Medications        New Medications        Instructions Start Date   atorvastatin 40 MG tablet  Commonly known as: LIPITOR   40 mg, Oral, Daily   Start Date: February 7, 2025     Eliquis 5 MG tablet tablet  Generic drug: apixaban   5 mg, Oral, Every 12 Hours Scheduled      oseltamivir 30 MG capsule  Commonly known as: TAMIFLU   30 mg, Oral, Every 12 Hours Scheduled             Changes to Medications        Instructions Start Date   lisinopril 20 MG tablet  Commonly known as: PRINIVIL,ZESTRIL  What changed: when to take this   20 mg, Oral, Daily             Continue These Medications        Instructions Start Date   amLODIPine 5 MG tablet  Commonly known as: NORVASC   5 mg, Daily      aspirin 81 MG tablet   81 mg, Daily      coenzyme Q10 100 MG capsule   200 mg, Daily      empagliflozin 10 MG tablet tablet  Commonly known as: JARDIANCE   Take  by mouth. Take one tablet daily      icosapent ethyl 1 g capsule capsule  Commonly known as: VASCEPA   2 g, 2 Times Daily With Meals      metFORMIN 1000 MG tablet  Commonly known as: GLUCOPHAGE   1,000 mg, 2 Times Daily With Meals      metoprolol succinate XL 25 MG 24 hr tablet  Commonly known as: TOPROL-XL   25 mg, Daily      terazosin 2 MG capsule  Commonly known as: HYTRIN   1 capsule, 2 Times Daily             Stop These Medications      hydroCHLOROthiazide 25 MG tablet     pravastatin 20 MG tablet  Commonly known as: PRAVACHOL              Allergies   Allergen Reactions    Contrast Dye (Echo Or Unknown Ct/Mr) Hives    Phenergan [Promethazine Hcl]  Nausea And Vomiting     .    Hydralazine Dizziness    Iodinated Contrast Media Hives    Promethazine GI Intolerance    Clonidine Rash       Discharge Disposition:  Home or Self Care      Discharge Diet:  Diet Order   Procedures    Diet: Cardiac, Diabetic; Healthy Heart (2-3 Na+); Consistent Carbohydrate; Fluid Consistency: Thin (IDDSI 0)       Discharge Activity:       CODE STATUS:    Code Status and Medical Interventions: CPR (Attempt to Resuscitate); Full Support   Ordered at: 02/04/25 2034     Level Of Support Discussed With:    Patient     Code Status (Patient has no pulse and is not breathing):    CPR (Attempt to Resuscitate)     Medical Interventions (Patient has pulse or is breathing):    Full Support       Future Appointments   Date Time Provider Department Center   2/7/2025 11:40 AM Frank Jim MD [facility]      Follow-up Information       Dylan Chan MD. Schedule an appointment as soon as possible for a visit in 1 week(s).    Specialty: Internal Medicine  Why: Follow-up with PCP within 1 week  Contact information:  [address] [address]  [phone]               Frank Jim MD Follow up.    Specialty: Cardiology  Why: Follow-up as scheduled  Contact information:  [address] [facility] [address]  [facility] [address] [phone]                             Time Spent on Discharge:  Greater than 30 minutes      Lupillo Vargas MD  [facility]  02/06/25  17:04 EST

## 2025-02-06 NOTE — PROGRESS NOTES
"Carlton Núñez  1945 79 y.o.  [id number]      Patient Care Team:  Dylan Chan MD as PCP - General  Cecile Mistry GC as Referring Physician (Genetic Counseling)    CC: Influenza, A-fib    Interval History: He is feeling a little bit better      Objective   Vital Signs  Temp:  [97.3 °F (36.3 °C)-99 °F (37.2 °C)] 98.4 °F (36.9 °C)  Heart Rate:  [58-71] 58  Resp:  [18] 18  BP: (131-175)/(57-67) 160/65    Intake/Output Summary (Last 24 hours) at 2/6/2025 1001  Last data filed at 2/6/2025 0900  Gross per 24 hour   Intake 360 ml   Output 100 ml   Net 260 ml     Flowsheet Rows      Flowsheet Row First Filed Value   Admission Height 182.9 cm (72\") Documented at 02/04/2025 2043   Admission Weight 82.6 kg (182 lb) Documented at 02/04/2025 2043            Physical Exam:   General Appearance:    Alert,oriented, in no acute distress   Lungs:     Clear to auscultation,BS are equal    Heart:    Normal S1 and S2, iRRR without murmur, gallop or rub   HEENT:    Sclerae are clear, no JVD or adenopathy   Abdomen:     Normal bowel sounds, soft nontender, nondistended, no HSM   Extremities:   Moves all extremities well, no edema, no cyanosis, no             Redness, no rash     Medication Review:      acetaminophen, 650 mg, Oral, BID  apixaban, 5 mg, Oral, Q12H  atorvastatin, 40 mg, Oral, Daily  [Held by provider] empagliflozin, 10 mg, Oral, Daily  guaiFENesin, 600 mg, Oral, Q12H  insulin lispro, 2-9 Units, Subcutaneous, 4x Daily AC & at Bedtime  metoprolol succinate XL, 25 mg, Oral, Daily  oseltamivir, 30 mg, Oral, Q12H  sodium chloride, 3 mL, Intravenous, Q12H      Pharmacy to Dose Oseltamivir (TAMIFLU),           I reviewed the patient's new clinical results.  I personally viewed and interpreted the patient's EKG/Telemetry data    Assessment/Plan  Active Hospital Problems    Diagnosis  POA    **Influenza A [J10.1]  Yes    Dehydration [E86.0]  Yes    LAUREANO (acute kidney injury) [N17.9]  Yes    Multiple falls [R29.6]  Not " Applicable    Influenza A with respiratory manifestations [J10.1]  Yes    Stage 3a chronic kidney disease [N18.31]  Yes    Type 2 diabetes mellitus with hyperglycemia, without long-term current use of insulin [E11.65]  Yes    History of colon cancer [Z85.038]  Yes    Fatigue [R53.83]  Yes    History of coronary artery bypass surgery [Z95.1]  Not Applicable    Hypertension [I10]  Yes    Hyperlipidemia [E78.5]  Yes    Coronary artery disease [I25.10]  Yes      Resolved Hospital Problems   No resolved problems to display.       Has atrial fibrillation I hoping its triggered from the flu which seems to be improving at this point his rate is controlled I would anticoagulate him we will resee him in about a month and we will see where he is at that point if he is symptomatic then we will try and get him back into a normal rhythm if he is not we may just rate control and anticoagulate him there is a good chance if it is triggered by the flu that it will cardiovert on its own    Frank Jim MD  02/06/25  10:01 EST

## 2025-02-06 NOTE — PLAN OF CARE
Problem: Adult Inpatient Plan of Care  Goal: Plan of Care Review  Outcome: Progressing  Flowsheets (Taken 2/6/2025 0542)  Progress: improving  Outcome Evaluation: pt alert oriented x 4, denies pain. pt on RA. pt NSR (60s) to SB (58-59) bpm on tele. no distress noted. Plan of care ongoing. fall precautions maintained.  Plan of Care Reviewed With: patient  Goal: Patient-Specific Goal (Individualized)  Outcome: Progressing  Goal: Absence of Hospital-Acquired Illness or Injury  Outcome: Progressing  Intervention: Identify and Manage Fall Risk  Recent Flowsheet Documentation  Taken 2/6/2025 0445 by Frances Levin RN  Safety Promotion/Fall Prevention:   clutter free environment maintained   fall prevention program maintained   lighting adjusted   room organization consistent   safety round/check completed  Taken 2/6/2025 0240 by Frances Levin RN  Safety Promotion/Fall Prevention:   clutter free environment maintained   fall prevention program maintained   lighting adjusted   room organization consistent   safety round/check completed  Taken 2/6/2025 0000 by Frances Levin RN  Safety Promotion/Fall Prevention:   clutter free environment maintained   fall prevention program maintained   lighting adjusted   room organization consistent   safety round/check completed  Taken 2/5/2025 2210 by Frances Levin RN  Safety Promotion/Fall Prevention:   clutter free environment maintained   fall prevention program maintained   lighting adjusted   room organization consistent   safety round/check completed  Taken 2/5/2025 2055 by Frances Levin RN  Safety Promotion/Fall Prevention:   clutter free environment maintained   fall prevention program maintained   lighting adjusted   room organization consistent   safety round/check completed  Intervention: Prevent Skin Injury  Recent Flowsheet Documentation  Taken 2/6/2025 0445 by Frances Levin RN  Body Position: position changed independently  Taken 2/6/2025 0240 by Frances Levin  RN  Body Position: position changed independently  Taken 2/6/2025 0000 by Frances Levin RN  Body Position: position changed independently  Taken 2/5/2025 2210 by Frances Levin RN  Body Position: position changed independently  Taken 2/5/2025 2055 by Frances Levin RN  Body Position: position changed independently  Intervention: Prevent Infection  Recent Flowsheet Documentation  Taken 2/6/2025 0445 by Frances Levin RN  Infection Prevention:   environmental surveillance performed   hand hygiene promoted   rest/sleep promoted   single patient room provided   equipment surfaces disinfected  Taken 2/6/2025 0240 by Frances Levin RN  Infection Prevention:   environmental surveillance performed   hand hygiene promoted   rest/sleep promoted   single patient room provided   equipment surfaces disinfected  Taken 2/6/2025 0000 by Frances Levin RN  Infection Prevention:   environmental surveillance performed   hand hygiene promoted   rest/sleep promoted   single patient room provided   equipment surfaces disinfected  Taken 2/5/2025 2210 by Frances Levin RN  Infection Prevention:   environmental surveillance performed   hand hygiene promoted   rest/sleep promoted   single patient room provided   equipment surfaces disinfected  Taken 2/5/2025 2055 by Frances Levin RN  Infection Prevention:   environmental surveillance performed   hand hygiene promoted   rest/sleep promoted   single patient room provided   equipment surfaces disinfected  Goal: Optimal Comfort and Wellbeing  Outcome: Progressing  Intervention: Provide Person-Centered Care  Recent Flowsheet Documentation  Taken 2/6/2025 0240 by Frances Levin RN  Trust Relationship/Rapport: care explained  Taken 2/5/2025 2055 by Frances Levin RN  Trust Relationship/Rapport:   care explained   emotional support provided   questions answered   reassurance provided   thoughts/feelings acknowledged  Goal: Readiness for Transition of Care  Outcome: Progressing   Goal Outcome  Evaluation:  Plan of Care Reviewed With: patient        Progress: improving  Outcome Evaluation: pt alert oriented x 4, denies pain. pt on RA. pt NSR (60s) to SB (58-59) bpm on tele. no distress noted. Plan of care ongoing. fall precautions maintained.

## 2025-02-06 NOTE — DISCHARGE INSTRUCTIONS
Notify your primary care provider if you experience chest pain, difficulty breathing, fevers/chills, nausea or vomiting, bleeding in stool, excessive diarrhea, numbness or weakness or tingling in any part of your body.      Hypertension.  Monitor blood pressure twice daily and record.  Follow-up with blood pressure log with primary care provider.  Resume taking hydrochlorothiazide 25 mg daily if blood pressure persistently elevated above 140 systolic.      Do not take nonsteroidal anti-inflammatory medication such as ibuprofen, Advil, naproxen, Aleve due to high risk of bleeding while taking in combination with Eliquis and aspirin.  Can take as needed Tylenol for pain.

## 2025-02-07 NOTE — OUTREACH NOTE
Prep Survey      Flowsheet Row Responses   [facility] patient discharged from? [place]   Is LACE score < 7 ? No   Eligibility Readm Mgmt   Discharge diagnosis *Influenza   Does the patient have one of the following disease processes/diagnoses(primary or secondary)? Other   Does the patient have Home health ordered? No   Is there a DME ordered? No   Prep survey completed? Yes            ISHAAN GELLER - Registered Nurse

## 2025-02-10 ENCOUNTER — READMISSION MANAGEMENT (OUTPATIENT)
Dept: CALL CENTER | Facility: HOSPITAL | Age: 80
End: 2025-02-10
Payer: COMMERCIAL

## 2025-02-10 NOTE — OUTREACH NOTE
Medical Week 1 Survey      Flowsheet Row Responses   [facility] patient discharged from? [place]   Does the patient have one of the following disease processes/diagnoses(primary or secondary)? Other   Week 1 attempt successful? Yes   Call start time 1503   Call end time 1505   Discharge diagnosis *Influenza   Person spoke with today (if not patient) and relationship pt   Meds reviewed with patient/caregiver? Yes   Is the patient having any side effects they believe may be caused by any medication additions or changes? No   Does the patient have all medications ordered at discharge? Yes   Is the patient taking all medications as directed (includes completed medication regime)? Yes   Does the patient have a primary care provider?  Yes   Does the patient have an appointment with their PCP within 7 days of discharge? Yes   Comments regarding PCP 1/11/25   Has the patient kept scheduled appointments due by today? N/A   Psychosocial issues? No   Did the patient receive a copy of their discharge instructions? Yes   Nursing interventions Reviewed instructions with patient   What is the patient's perception of their health status since discharge? Improving   Is the patient/caregiver able to teach back signs and symptoms related to disease process for when to call PCP? Yes   Is the patient/caregiver able to teach back signs and symptoms related to disease process for when to call 911? Yes   Is the patient/caregiver able to teach back the hierarchy of who to call/visit for symptoms/problems? PCP, Specialist, Home health nurse, Urgent Care, ED, 911 Yes   Week 1 call completed? Yes   Would this patient benefit from a Referral to Amb Social Work? No   Is the patient interested in additional calls from an ambulatory ? No   Wrap up additional comments Pt states he is doing good, and denies any n/v/fever at this time. Pt does not have an appetite and advised to drink protein drinks for nutrition. No medication  issues. Pt has PCP fu appt tomorrow, 2/11/25   Call end time 6915            Jeri MASON - Registered Nurse

## 2025-02-19 ENCOUNTER — READMISSION MANAGEMENT (OUTPATIENT)
Dept: CALL CENTER | Facility: HOSPITAL | Age: 80
End: 2025-02-19
Payer: COMMERCIAL

## 2025-02-19 NOTE — OUTREACH NOTE
Medical Week 2 Survey      Flowsheet Row Responses   [facility] patient discharged from? [place]   Does the patient have one of the following disease processes/diagnoses(primary or secondary)? Other   Week 2 attempt successful? Yes   Call start time 1159   Call end time 1200   Meds reviewed with patient/caregiver? Yes   Is the patient having any side effects they believe may be caused by any medication additions or changes? No   Does the patient have all medications ordered at discharge? Yes   Is the patient taking all medications as directed (includes completed medication regime)? Yes   Does the patient have a primary care provider?  Yes   Does the patient have an appointment with their PCP within 7 days of discharge? Yes   Comments regarding PCP Has had PCP follow up   Has the patient kept scheduled appointments due by today? Yes   Has home health visited the patient within 72 hours of discharge? N/A   Psychosocial issues? No   Did the patient receive a copy of their discharge instructions? Yes   Nursing interventions Reviewed instructions with patient   What is the patient's perception of their health status since discharge? Improving   Is the patient/caregiver able to teach back signs and symptoms related to disease process for when to call PCP? Yes   Is the patient/caregiver able to teach back signs and symptoms related to disease process for when to call 911? Yes   Is the patient/caregiver able to teach back the hierarchy of who to call/visit for symptoms/problems? PCP, Specialist, Home health nurse, Urgent Care, ED, 911 Yes   If the patient is a current smoker, are they able to teach back resources for cessation? Not a smoker   Week 2 Call Completed? Yes   Is the patient interested in additional calls from an ambulatory ? No   Would this patient benefit from a Referral to [facility] Social Work? No   Call end time 1200            Sherrie HEADLEY - Registered Nurse

## 2025-04-23 ENCOUNTER — OFFICE VISIT (OUTPATIENT)
Dept: CARDIOLOGY | Age: 80
End: 2025-04-23
Payer: COMMERCIAL

## 2025-04-23 VITALS
WEIGHT: 174 LBS | OXYGEN SATURATION: 96 % | HEART RATE: 60 BPM | HEIGHT: 72 IN | DIASTOLIC BLOOD PRESSURE: 80 MMHG | BODY MASS INDEX: 23.57 KG/M2 | SYSTOLIC BLOOD PRESSURE: 140 MMHG

## 2025-04-23 DIAGNOSIS — I10 PRIMARY HYPERTENSION: Chronic | ICD-10-CM

## 2025-04-23 DIAGNOSIS — I35.1 MODERATE AORTIC INSUFFICIENCY: Chronic | ICD-10-CM

## 2025-04-23 DIAGNOSIS — I25.10 CORONARY ARTERY DISEASE INVOLVING NATIVE CORONARY ARTERY OF NATIVE HEART WITHOUT ANGINA PECTORIS: Primary | Chronic | ICD-10-CM

## 2025-04-23 DIAGNOSIS — E11.65 TYPE 2 DIABETES MELLITUS WITH HYPERGLYCEMIA, WITHOUT LONG-TERM CURRENT USE OF INSULIN: ICD-10-CM

## 2025-04-23 DIAGNOSIS — Z95.1 HISTORY OF CORONARY ARTERY BYPASS SURGERY: Chronic | ICD-10-CM

## 2025-04-23 DIAGNOSIS — C18.9 MALIGNANT NEOPLASM OF COLON, UNSPECIFIED PART OF COLON: ICD-10-CM

## 2025-04-23 DIAGNOSIS — E78.2 MIXED HYPERLIPIDEMIA: Chronic | ICD-10-CM

## 2025-04-23 PROBLEM — R00.2 PALPITATIONS: Chronic | Status: RESOLVED | Noted: 2020-03-11 | Resolved: 2025-04-23

## 2025-04-23 RX ORDER — APIXABAN 5 MG/1
1 TABLET, FILM COATED ORAL EVERY 12 HOURS SCHEDULED
COMMUNITY
Start: 2025-03-08

## 2025-04-23 RX ORDER — LISINOPRIL 20 MG/1
20 TABLET ORAL 2 TIMES DAILY
Qty: 180 TABLET | Refills: 3
Start: 2025-04-23

## 2025-04-23 RX ORDER — ATORVASTATIN CALCIUM 40 MG/1
1 TABLET, FILM COATED ORAL DAILY
COMMUNITY
Start: 2025-03-03

## 2025-04-23 NOTE — PROGRESS NOTES
Date of Office Visit: 25  Encounter Provider: Frank Jim MD  Place of Service: [facility] CARDIOLOGY  Patient Name: Carlton Núñez  :1945  [id number]    Chief Complaint   Patient presents with    Hospital Follow Up Visit        HPI: Carlton Núñez is a 79 y.o. male  who has a history of hypertension, hyperlipidemia, paroxysmal A-fib, moderate aortic insufficiency, and LUCY.  In the  he had a 2 vessel bypass by Dr. Robert Barney at [facility].  In  had a cardiac cath in both his KARIE and LIMA were patent, his circumflex was free from disease with a normal EF, with moderate aortic insufficiency.  He was hospitalized with the influenza in  and went into A-fib.  We took a conservative approach of anticoagulation and rate control and that worked great he is in sinus rhythm today.  He did have an echo at that time with normal LV function mild to moderate MR and AI a little bit of a dilated aortic root at 4 cm.    He feels well no chest pain shortness of breath PND orthopnea edema syncope palpitations no bleeding difficulty he does say his blood pressure is usually high like it is here today.    Past Medical History:   Diagnosis Date    Allergy     Atrial fibrillation     Colon cancer     Coronary artery disease     Diabetes mellitus     Heart attack     High cholesterol     History of being hospitalized     Heart, colon cancer, hernia    History of cardiac cath     2016 - Two vessel CAD with patent LIMA/KARIE graftsto LAD and RCA. Culprit vessel likely diagonal with 95% ostial lesion, too small for intervension, best treated medically. LCX 60% proximal but no flow limiting. EF 55-60%.    History of echocardiogram     17 - Mild concentric LVH. EF 55-60%. Mildly dilated LA. Moderate AR. Mild to moderate MR. Mild TR ad ID    History of nuclear stress test     11/10/16 - Mild inferior wall hypokinesis. Inferoapical wall hypokinesis. The overall EF  is 56%. Study suggestive of prior inferior wall infarction with a mild to moderate area of ischemia remaining.    Hyperlipidemia     Hypertension     Irregular heart beat     Kidney stones     Myocardial infarction     LUCY (obstructive sleep apnea)        Past Surgical History:   Procedure Laterality Date    CARDIAC CATHETERIZATION N/A 6/23/2020    Procedure: Left Heart Cath;  Surgeon: Alton Coronado MD;  Location:  [facility];  Service: Cardiovascular;  Laterality: N/A;    CARDIAC CATHETERIZATION N/A 6/23/2020    Procedure: Coronary angiography;  Surgeon: Alton Coronado MD;  Location:  [facility];  Service: Cardiovascular;  Laterality: N/A;    CARDIAC CATHETERIZATION N/A 6/23/2020    Procedure: Left ventriculography;  Surgeon: Alton Coronado MD;  Location:  [facility];  Service: Cardiovascular;  Laterality: N/A;    CARDIAC CATHETERIZATION N/A 6/23/2020    Procedure: Native mammary injection;  Surgeon: Alton Coronado MD;  Location:  [facility];  Service: Cardiovascular;  Laterality: N/A;    CATARACT EXTRACTION WITH INTRAOCULAR LENS IMPLANT Bilateral     COLON RESECTION      CORONARY ARTERY BYPASS GRAFT  12/01/1996    LIMA to LAD and KARIE to RCA, performed by Dr Barney.    INGUINAL HERNIA REPAIR Bilateral     SINUS SURGERY      TONSILLECTOMY         Social History     Socioeconomic History    Marital status:    Tobacco Use    Smoking status: Never    Smokeless tobacco: Never   Vaping Use    Vaping status: Never Used   Substance and Sexual Activity    Alcohol use: Not Currently    Drug use: Never    Sexual activity: Defer       Family History   Problem Relation Age of Onset    Pancreatic cancer Mother     Breast cancer Mother     Cancer Other     Heart attack Other     Hypertension Other     Other Daughter         PALB2+       Review of Systems   Constitutional: Negative for decreased appetite, fever, malaise/fatigue and weight  loss.   HENT:  Negative for nosebleeds.    Eyes:  Negative for double vision.   Cardiovascular:  Negative for chest pain, claudication, cyanosis, dyspnea on exertion, irregular heartbeat, leg swelling, near-syncope, orthopnea, palpitations, paroxysmal nocturnal dyspnea and syncope.   Respiratory:  Negative for cough, hemoptysis and shortness of breath.    Hematologic/Lymphatic: Negative for bleeding problem.   Skin:  Negative for rash.   Musculoskeletal:  Negative for falls and myalgias.   Gastrointestinal:  Negative for hematochezia, jaundice, melena, nausea and vomiting.   Genitourinary:  Negative for hematuria.   Neurological:  Negative for dizziness and seizures.   Psychiatric/Behavioral:  Negative for altered mental status and memory loss.        Allergies   Allergen Reactions    Contrast Dye (Echo Or Unknown Ct/Mr) Hives    Phenergan [Promethazine Hcl] Nausea And Vomiting     .    Hydralazine Dizziness    Iodinated Contrast Media Hives    Promethazine GI Intolerance    Clonidine Rash         Current Outpatient Medications:     amLODIPine (NORVASC) 5 MG tablet, Take 1 tablet by mouth Daily., Disp: , Rfl:     aspirin 81 MG tablet, Take 1 tablet by mouth Daily., Disp: , Rfl:     atorvastatin (LIPITOR) 40 MG tablet, Take 1 tablet by mouth Daily., Disp: , Rfl:     coenzyme Q10 100 MG capsule, Take 2 capsules by mouth Daily., Disp: , Rfl:     Eliquis 5 MG tablet tablet, Take 1 tablet by mouth Every 12 (Twelve) Hours., Disp: , Rfl:     empagliflozin (JARDIANCE) 10 MG tablet tablet, Take  by mouth. Take one tablet daily, Disp: , Rfl:     icosapent ethyl (VASCEPA) 1 g capsule capsule, Take 2 g by mouth 2 (Two) Times a Day With Meals., Disp: , Rfl:     lisinopril (PRINIVIL,ZESTRIL) 20 MG tablet, Take 1 tablet by mouth Daily., Disp: , Rfl:     metFORMIN (GLUCOPHAGE) 1000 MG tablet, Take 1 tablet by mouth 2 (Two) Times a Day With Meals., Disp: , Rfl:     metoprolol succinate XL (TOPROL-XL) 25 MG 24 hr tablet, Take 1  "tablet by mouth Daily., Disp: , Rfl:     terazosin (HYTRIN) 2 MG capsule, Take 1 capsule by mouth 2 (Two) Times a Day., Disp: , Rfl:       Objective:     Vitals:    04/23/25 1443   BP: 140/80   Pulse: 60   SpO2: 96%   Weight: 78.9 kg (174 lb)   Height: 182.9 cm (72\")     Body mass index is 23.6 kg/m².    Constitutional:       Appearance: Well-developed.   Eyes:      General: No scleral icterus.  HENT:      Head: Normocephalic.   Neck:      Thyroid: No thyromegaly.      Vascular: No JVD.      Lymphadenopathy: No cervical adenopathy.   Pulmonary:      Effort: Pulmonary effort is normal.      Breath sounds: Normal breath sounds. No wheezing. No rales.   Cardiovascular:      Normal rate. Regular rhythm.      No gallop.    Edema:     Peripheral edema absent.   Abdominal:      Palpations: Abdomen is soft.      Tenderness: There is no abdominal tenderness.   Musculoskeletal: Normal range of motion. Skin:     General: Skin is warm and dry.      Findings: No rash.   Neurological:      Mental Status: Alert and oriented to person, place, and time.           ECG 12 Lead    Date/Time: 4/23/2025 3:10 PM  Performed by: Frank Jim MD    Authorized by: Frank Jim MD  Comparison: compared with previous ECG   Similar to previous ECG  Rhythm: sinus rhythm  Other findings: non-specific ST-T wave changes    Clinical impression: abnormal EKG           Assessment:       Diagnosis Plan   1. Coronary artery disease involving native coronary artery of native heart without angina pectoris        2. History of coronary artery bypass surgery        3. Mixed hyperlipidemia        4. Primary hypertension        5. Moderate aortic insufficiency        6. Type 2 diabetes mellitus with hyperglycemia, without long-term current use of insulin        7. Malignant neoplasm of colon, unspecified part of colon               Plan:       He has converted back to sinus rhythm which is great and expected.  I do do think it means he is more likely " to have that happen again in the future.  His blood pressure is a little high but increase his lisinopril to 20 mg twice a day and I told him to let us know if his blood pressure stays high then we drop his amlodipine.  His lipids are low he has had bypass before he is asymptomatic he has normal LV function.  I am just going to have him come back and see us in a year he is not on a statin because his LDL was 39    Return for See Eliana Guzman in 1 year, See me in 2 years.     As always, it has been a pleasure to participate in your patient's care.      Sincerely,       Frank Jim MD

## (undated) DEVICE — GW INQWIRE FC PTFE J/3MM .035 180

## (undated) DEVICE — PK CATH CARD 40

## (undated) DEVICE — GW EMR FIX EXCHG J STD .035 3MM 260CM

## (undated) DEVICE — Device

## (undated) DEVICE — CATH DIAG IMPULSE FR4 5F 100CM

## (undated) DEVICE — CATH DIAG IMPULSE MPA2 SH 5F 100CM

## (undated) DEVICE — CATH DIAG IMPULSE FL4 5F 100CM

## (undated) DEVICE — CATH DIAG IMPULSE IMT 5F 100CM

## (undated) DEVICE — INTRO SHEATH ART/FEM ENGAGE .035 6F12CM

## (undated) DEVICE — KT MANIFLD CARDIAC